# Patient Record
Sex: FEMALE | Race: WHITE | NOT HISPANIC OR LATINO | Employment: UNEMPLOYED | URBAN - METROPOLITAN AREA
[De-identification: names, ages, dates, MRNs, and addresses within clinical notes are randomized per-mention and may not be internally consistent; named-entity substitution may affect disease eponyms.]

---

## 2018-10-29 ENCOUNTER — OFFICE VISIT - HEALTHEAST (OUTPATIENT)
Dept: INTERNAL MEDICINE | Facility: CLINIC | Age: 23
End: 2018-10-29

## 2018-10-29 DIAGNOSIS — J06.9 URTI (ACUTE UPPER RESPIRATORY INFECTION): ICD-10-CM

## 2018-10-29 DIAGNOSIS — F33.1 MODERATE EPISODE OF RECURRENT MAJOR DEPRESSIVE DISORDER (H): ICD-10-CM

## 2018-10-29 DIAGNOSIS — J45.20 MILD INTERMITTENT ASTHMA WITHOUT COMPLICATION: ICD-10-CM

## 2018-10-29 RX ORDER — ALBUTEROL SULFATE 90 UG/1
2 AEROSOL, METERED RESPIRATORY (INHALATION) EVERY 6 HOURS PRN
Status: SHIPPED | COMMUNITY
Start: 2018-10-29 | End: 2022-04-07

## 2018-10-29 ASSESSMENT — MIFFLIN-ST. JEOR: SCORE: 1568.69

## 2019-01-11 ENCOUNTER — COMMUNICATION - HEALTHEAST (OUTPATIENT)
Dept: INTERNAL MEDICINE | Facility: CLINIC | Age: 24
End: 2019-01-11

## 2019-01-22 ENCOUNTER — COMMUNICATION - HEALTHEAST (OUTPATIENT)
Dept: TELEHEALTH | Facility: CLINIC | Age: 24
End: 2019-01-22

## 2019-01-22 ENCOUNTER — OFFICE VISIT - HEALTHEAST (OUTPATIENT)
Dept: INTERNAL MEDICINE | Facility: CLINIC | Age: 24
End: 2019-01-22

## 2019-01-22 DIAGNOSIS — F33.1 MODERATE EPISODE OF RECURRENT MAJOR DEPRESSIVE DISORDER (H): ICD-10-CM

## 2019-01-22 DIAGNOSIS — M94.0 COSTOCHONDRITIS: ICD-10-CM

## 2019-01-22 DIAGNOSIS — Z00.00 ROUTINE HISTORY AND PHYSICAL EXAMINATION OF ADULT: ICD-10-CM

## 2019-01-22 DIAGNOSIS — M26.609 TMJ (TEMPOROMANDIBULAR JOINT SYNDROME): ICD-10-CM

## 2019-01-22 ASSESSMENT — MIFFLIN-ST. JEOR: SCORE: 1610.64

## 2019-01-24 LAB
C TRACH DNA SPEC QL PROBE+SIG AMP: NEGATIVE
N GONORRHOEA DNA SPEC QL NAA+PROBE: NEGATIVE

## 2019-01-25 ENCOUNTER — COMMUNICATION - HEALTHEAST (OUTPATIENT)
Dept: INTERNAL MEDICINE | Facility: CLINIC | Age: 24
End: 2019-01-25

## 2019-01-25 LAB
BKR LAB AP ABNORMAL BLEEDING: NO
BKR LAB AP BIRTH CONTROL/HORMONES: NORMAL
BKR LAB AP CERVICAL APPEARANCE: NORMAL
BKR LAB AP GYN ADEQUACY: NORMAL
BKR LAB AP GYN INTERPRETATION: NORMAL
BKR LAB AP GYN OTHER FINDINGS: NORMAL
BKR LAB AP HPV REFLEX: NORMAL
BKR LAB AP LMP: NORMAL
BKR LAB AP PATIENT STATUS: NORMAL
BKR LAB AP PREVIOUS ABNORMAL: NORMAL
BKR LAB AP PREVIOUS NORMAL: NORMAL
HIGH RISK?: NO
PATH REPORT.COMMENTS IMP SPEC: NORMAL
RESULT FLAG (HE HISTORICAL CONVERSION): NORMAL

## 2019-02-19 ENCOUNTER — COMMUNICATION - HEALTHEAST (OUTPATIENT)
Dept: INTERNAL MEDICINE | Facility: CLINIC | Age: 24
End: 2019-02-19

## 2019-04-04 ENCOUNTER — COMMUNICATION - HEALTHEAST (OUTPATIENT)
Dept: INTERNAL MEDICINE | Facility: CLINIC | Age: 24
End: 2019-04-04

## 2019-04-04 DIAGNOSIS — F33.1 MODERATE EPISODE OF RECURRENT MAJOR DEPRESSIVE DISORDER (H): ICD-10-CM

## 2020-03-19 ENCOUNTER — COMMUNICATION - HEALTHEAST (OUTPATIENT)
Dept: INTERNAL MEDICINE | Facility: CLINIC | Age: 25
End: 2020-03-19

## 2020-07-23 ENCOUNTER — VIRTUAL VISIT (OUTPATIENT)
Dept: FAMILY MEDICINE | Facility: OTHER | Age: 25
End: 2020-07-23
Payer: COMMERCIAL

## 2020-07-24 ENCOUNTER — AMBULATORY - HEALTHEAST (OUTPATIENT)
Dept: NURSING | Facility: CLINIC | Age: 25
End: 2020-07-24

## 2020-07-24 ENCOUNTER — AMBULATORY - HEALTHEAST (OUTPATIENT)
Dept: FAMILY MEDICINE | Facility: CLINIC | Age: 25
End: 2020-07-24

## 2020-07-24 DIAGNOSIS — Z20.822 SUSPECTED COVID-19 VIRUS INFECTION: ICD-10-CM

## 2020-07-25 NOTE — PROGRESS NOTES
"Date: 2020 11:40:16  Clinician: Sabino Bell  Clinician NPI: [id number]  Patient: Ruthy David  Patient : 1995  Patient Address: [address] [place] [address]  Patient Phone: [phone]  Visit Protocol: URI  Patient Summary:  Ruthy is a 24 year old ( : 1995 ) female who initiated a Visit for COVID-19 (Coronavirus) evaluation and screening. When asked the question \"Please sign me up to receive news, health information and promotions from [employer].\", Ruthy responded \"No\".    Ruthy states her symptoms started gradually 3-4 days ago.   Her symptoms consist of diarrhea, facial pain or pressure, nasal congestion, malaise, and a headache.   Symptom details     Nasal secretions: The color of her mucus is clear.    Facial pain or pressure: The facial pain or pressure feels worse when bending over or leaning forward.     Headache: She states the headache is moderate (4-6 on a 10 point pain scale).      Ruthy denies having wheezing, nausea, teeth pain, ageusia, myalgias, sore throat, anosmia, fever, cough, vomiting, rhinitis, ear pain, and chills. She also denies having recent facial or sinus surgery in the past 60 days, double sickening (worsening symptoms after initial improvement), and taking antibiotic medication in the past month. She is not experiencing dyspnea.    Pertinent COVID-19 (Coronavirus) information  In the past 14 days, Ruthy has not worked in a congregate living setting.   She does not work or volunteer as healthcare worker or a  and does not work or volunteer in a healthcare facility.   Ruthy also has not lived in a congregate living setting in the past 14 days. She does not live with a healthcare worker.   Ruthy has not had a close contact with a laboratory-confirmed COVID-19 patient within 14 days of symptom onset.   Pertinent medical history  Ruthy does not get yeast infections when she takes antibiotics.   Ruthy needs a return to work/school " note.   Weight: 180 lbs   Ruthy smokes or uses smokeless tobacco.   She denies pregnancy and denies breastfeeding. She is currently menstruating.   Weight: 180 lbs    MEDICATIONS: Claritin RediTabs oral, duloxetine oral, bupropion HCl oral, ALLERGIES: NKDA  Clinician Response:  Dear Ruthy,   Your symptoms show that you may have coronavirus (COVID-19). This illness can cause fever, cough and trouble breathing. Many people get a mild case and get better on their own. Some people can get very sick.  What should I do?  We would like to test you for this virus.   1. Please call [phone] to schedule your visit. Explain that [facility] have [facility]-19 test. Be ready to share your [facility] ID number.  The following will serve as your written order for this COVID Test, ordered by me, for the indication of suspected COVID [Z20.828]: The test will be ordered in [employer], our electronic health record, after you are scheduled. It will show as ordered and authorized by Aj Washington MD.  Order: COVID-19 (Coronavirus) PCR for SYMPTOMATIC testing from [facility].      2. When it's time for your COVID test:  Stay at least 6 feet away from others. (If someone will drive you to your test, stay in the backseat, as far away from the  as you can.)   Cover your mouth and nose with a mask, tissue or washcloth.  Go straight to the testing site. Don't make any stops on the way there or back.      3.Starting now: Stay home and away from others (self-isolate) until:   You've had no fever---and no medicine that reduces fever---for 3 full days (72 hours). And...   Your other symptoms have gotten better. For example, your cough or breathing has improved. And...   At least 10 days have passed since your symptoms started.       During this time, don't leave the house except for testing or medical care.   Stay in your own room, even for meals. Use your own bathroom if you can.   Stay away from others in your home. No  "hugging, kissing or shaking hands. No visitors.  Don't go to work, school or anywhere else.    Clean \"high touch\" surfaces often (doorknobs, counters, handles, etc.). Use a household cleaning spray or wipes. You'll find a full list of  on the EPA website: www.epa.gov/pesticide-registration/list-n-disinfectants-use-against-sars-cov-2.   Cover your mouth and nose with a mask, tissue or washcloth to avoid spreading germs.  Wash your hands and face often. Use soap and water.  Caregivers in these groups are at risk for severe illness due to COVID-19:  o People 65 years and older  o People who live in a nursing home or long-term care facility  o People with chronic disease (lung, heart, cancer, diabetes, kidney, liver, immunologic)  o People who have a weakened immune system, including those who:   Are in cancer treatment  Take medicine that weakens the immune system, such as corticosteroids  Had a bone marrow or organ transplant  Have an immune deficiency  Have poorly controlled HIV or AIDS  Are obese (body mass index of 40 or higher)  Smoke regularly   o Caregivers should wear gloves while washing dishes, handling laundry and cleaning bedrooms and bathrooms.  o Use caution when washing and drying laundry: Don't shake dirty laundry, and use the warmest water setting that you can.  o For more tips, go to www.cdc.gov/coronavirus/2019-ncov/downloads/10Things.pdf.    4.Sign up for [employer]. We know it's scary to hear that you might have COVID-19. We want to track your symptoms to make sure you're okay over the next 2 weeks. Please look for an email from [employer]---this is a free, online program that we'll use to keep in touch. To sign up, follow the link in the email. Learn more at [email or web address]  How can I take care of myself?   Get lots of rest. Drink extra fluids (unless a doctor has told you not to).   Take Tylenol (acetaminophen) for fever or pain. If you have liver or kidney problems, " ask your family doctor if it's okay to take Tylenol.   Adults can take either:    650 mg (two 325 mg pills) every 4 to 6 hours, or...   1,000 mg (two 500 mg pills) every 8 hours as needed.    Note: Don't take more than 3,000 mg in one day. Acetaminophen is found in many medicines (both prescribed and over-the-counter medicines). Read all labels to be sure you don't take too much.   For children, check the Tylenol bottle for the right dose. The dose is based on the child's age or weight.    If you have other health problems (like cancer, heart failure, an organ transplant or severe kidney disease): Call your specialty clinic if you don't feel better in the next 2 days.       Know when to call 911. Emergency warning signs include:    Trouble breathing or shortness of breath Pain or pressure in the chest that doesn't go away Feeling confused like you haven't felt before, or not being able to wake up Bluish-colored lips or face.  Where can I get more information?   [employer] -- About COVID-19: [email or web address]   CDC -- What to Do If You're Sick: www.cdc.gov/coronavirus/2019-ncov/about/steps-when-sick.html   CDC -- Ending Home Isolation: www.cdc.gov/coronavirus/2019-ncov/hcp/disposition-in-home-patients.html   [facility] -- Caring for Someone: www.cdc.gov/coronavirus/2019-ncov/if-you-are-sick/care-for-someone.html   [facility] -- Interim Guidance for Hospital Discharge to Home: www.health.[facility].mn.us/diseases/coronavirus/hcp/hospdischarge.pdf   [facility] clinical trials (COVID-19 research studies): clinicalaffairs.[facility].edu/[facility]-clinical-trials    Below are the COVID-19 hotlines at the [place] Department of Health ([facility]). Interpreters are available.    For health questions: Call 223-382-9508 or 1-433.926.7443 (7 a.m. to 7 p.m.) For questions about schools and childcare: Call 742-691-6683 or [phone] (7 a.m. to 7 p.m.)    Diagnosis: Nasal congestion  Diagnosis ICD: R09.81

## 2020-07-26 ENCOUNTER — COMMUNICATION - HEALTHEAST (OUTPATIENT)
Dept: FAMILY MEDICINE | Facility: CLINIC | Age: 25
End: 2020-07-26

## 2020-09-14 ENCOUNTER — COMMUNICATION - HEALTHEAST (OUTPATIENT)
Dept: SCHEDULING | Facility: CLINIC | Age: 25
End: 2020-09-14

## 2020-09-15 ENCOUNTER — OFFICE VISIT - HEALTHEAST (OUTPATIENT)
Dept: FAMILY MEDICINE | Facility: CLINIC | Age: 25
End: 2020-09-15

## 2020-09-15 DIAGNOSIS — Z30.430 ENCOUNTER FOR IUD INSERTION: ICD-10-CM

## 2020-09-15 DIAGNOSIS — Z23 NEED FOR VACCINATION: ICD-10-CM

## 2020-09-15 DIAGNOSIS — F33.1 MODERATE EPISODE OF RECURRENT MAJOR DEPRESSIVE DISORDER (H): ICD-10-CM

## 2020-09-15 LAB — HCG UR QL: NEGATIVE

## 2020-09-15 RX ORDER — BUPROPION HYDROCHLORIDE 150 MG/1
150 TABLET ORAL DAILY
Qty: 90 TABLET | Refills: 0 | Status: SHIPPED | OUTPATIENT
Start: 2020-09-15 | End: 2023-08-23

## 2020-09-15 RX ORDER — DULOXETIN HYDROCHLORIDE 60 MG/1
60 CAPSULE, DELAYED RELEASE ORAL DAILY
Qty: 90 CAPSULE | Refills: 0 | Status: SHIPPED | OUTPATIENT
Start: 2020-09-15

## 2020-09-15 ASSESSMENT — ANXIETY QUESTIONNAIRES
GAD7 TOTAL SCORE: 11
7. FEELING AFRAID AS IF SOMETHING AWFUL MIGHT HAPPEN: SEVERAL DAYS
1. FEELING NERVOUS, ANXIOUS, OR ON EDGE: NEARLY EVERY DAY
2. NOT BEING ABLE TO STOP OR CONTROL WORRYING: MORE THAN HALF THE DAYS
4. TROUBLE RELAXING: MORE THAN HALF THE DAYS
5. BEING SO RESTLESS THAT IT IS HARD TO SIT STILL: NOT AT ALL
3. WORRYING TOO MUCH ABOUT DIFFERENT THINGS: MORE THAN HALF THE DAYS
6. BECOMING EASILY ANNOYED OR IRRITABLE: SEVERAL DAYS
IF YOU CHECKED OFF ANY PROBLEMS ON THIS QUESTIONNAIRE, HOW DIFFICULT HAVE THESE PROBLEMS MADE IT FOR YOU TO DO YOUR WORK, TAKE CARE OF THINGS AT HOME, OR GET ALONG WITH OTHER PEOPLE: SOMEWHAT DIFFICULT

## 2020-09-15 ASSESSMENT — PATIENT HEALTH QUESTIONNAIRE - PHQ9: SUM OF ALL RESPONSES TO PHQ QUESTIONS 1-9: 13

## 2020-09-15 NOTE — ASSESSMENT & PLAN NOTE
The patient is doing well. Her psychiatrist has left her practice and the patient is in the progress of establishing with a new psychiatrist. She is requesting a bridge of her medication which was given.

## 2020-09-16 LAB
C TRACH DNA SPEC QL PROBE+SIG AMP: NEGATIVE
N GONORRHOEA DNA SPEC QL NAA+PROBE: NEGATIVE

## 2020-09-28 ENCOUNTER — AMBULATORY - HEALTHEAST (OUTPATIENT)
Dept: FAMILY MEDICINE | Facility: CLINIC | Age: 25
End: 2020-09-28

## 2020-09-28 ENCOUNTER — VIRTUAL VISIT (OUTPATIENT)
Dept: FAMILY MEDICINE | Facility: OTHER | Age: 25
End: 2020-09-28
Payer: COMMERCIAL

## 2020-09-28 DIAGNOSIS — Z20.822 SUSPECTED COVID-19 VIRUS INFECTION: ICD-10-CM

## 2020-09-28 PROCEDURE — 99421 OL DIG E/M SVC 5-10 MIN: CPT | Performed by: PHYSICIAN ASSISTANT

## 2020-09-28 NOTE — PROGRESS NOTES
"Date: 2020 12:16:18  Clinician: Sabino Bell  Clinician NPI: [id number]  Patient: Ruthy David  Patient : 1995  Patient Address: [address] [place] [address]  Patient Phone: [phone]  Visit Protocol: URI  Patient Summary:  Ruthy is a 25 year old ( : 1995 ) female who initiated a [facility] Visit for COVID-19 (Coronavirus) evaluation and screening. When asked the question \"Please sign me up to receive news, health information and promotions from [facility].\", Ruthy responded \"No\".    Ruthy states her symptoms started today.   Her symptoms consist of a cough, nasal congestion, ear pain, wheezing, malaise, and diarrhea. She is experiencing mild difficulty breathing with activities but can speak normally in full sentences. Ruthy also feels feverish.   Symptom details     Nasal secretions: The color of her mucus is clear.    Cough: Ruthy coughs every 5-10 minutes and her cough is not more bothersome at night. Phlegm does not come into her throat when she coughs. She does not believe her cough is caused by post-nasal drip.     Temperature: Her current temperature is 99.5 degrees Fahrenheit.     Wheezing: Ruthy has been diagnosed with asthma. Additional wheezing details as reported by the patient (free text): Shortness of breath and wheezing woke me up this morning, but have dissipated with the use of albuterol emergency inhaler. Continuing to cough every few minutes        Ruthy denies having headache, anosmia, vomiting, rhinitis, nausea, enlarged lymph nodes, facial pain or pressure, myalgias, chills, sore throat, teeth pain, and ageusia. She also denies taking antibiotic medication in the past month and having recent facial or sinus surgery in the past 60 days.   Precipitating events  She has not recently been exposed to someone with influenza. Ruthy has not been in close contact with any high risk individuals.   Pertinent COVID-19 (Coronavirus) information  In the past 14 " days, Ruthy has not worked in a congregate living setting.   She does not work or volunteer as healthcare worker or a  and does not work or volunteer in a healthcare facility.   Ruthy also has not lived in a congregate living setting in the past 14 days. She does not live with a healthcare worker.   Ruthy has not had a close contact with a laboratory-confirmed COVID-19 patient within 14 days of symptom onset.   Since December 2019, Ruthy and has not had upper respiratory infection or influenza-like illness. Has not been diagnosed with lab-confirmed COVID-19 test   Pertinent medical history  Ruthy does not get yeast infections when she takes antibiotics.   Ruthy needs a return to work/school note.   Weight: 190 lbs   Ruthy smokes or uses smokeless tobacco.   She denies pregnancy and denies breastfeeding. She has menstruated in the past month.   Weight: 190 lbs    MEDICATIONS: Claritin RediTabs oral, duloxetine oral, bupropion HCl oral, ALLERGIES: NKDA  Clinician Response:  Dear Ruthy,   Your symptoms show that you may have coronavirus (COVID-19). This illness can cause fever, cough and trouble breathing. Many people get a mild case and get better on their own. Some people can get very sick.  What should I do?  We would like to test you for this virus.   1. Please call [phone] to schedule your visit. Explain that you were referred by [facility] to have a COVID-19 test. Be ready to [facility].  The following will serve as your written order for this COVID Test, ordered by me, for the indication of suspected COVID [Z20.828]: The test will be ordered in [employer], our electronic health record, after you are scheduled. It will show as ordered and authorized by Aj Washington MD.  Order: COVID-19 (Coronavirus) PCR for SYMPTOMATIC testing from [facility].      2. When it's time for your COVID test:  Stay at least 6 feet away from others. (If someone will drive you to your test, stay in the  "backseat, as far away from the  as you can.)   Cover your mouth and nose with a mask, tissue or washcloth.  Go straight to the testing site. Don't make any stops on the way there or back.      3.Starting now: Stay home and away from others (self-isolate) until:   You've had no fever---and no medicine that reduces fever---for one full day (24 hours). And...   Your other symptoms have gotten better. For example, your cough or breathing has improved. And...   At least 10 days have passed since your symptoms started.       During this time, don't leave the house except for testing or medical care.   Stay in your own room, even for meals. Use your own bathroom if you can.   Stay away from others in your home. No hugging, kissing or shaking hands. No visitors.  Don't go to work, school or anywhere else.    Clean \"high touch\" surfaces often (doorknobs, counters, handles, etc.). Use a household cleaning spray or wipes. You'll find a full list of  on the EPA website: www.epa.gov/pesticide-registration/list-n-disinfectants-use-against-sars-cov-2.   Cover your mouth and nose with a mask, tissue or washcloth to avoid spreading germs.  Wash your hands and face often. Use soap and water.  Caregivers in these groups are at risk for severe illness due to COVID-19:  o People 65 years and older  o People who live in a nursing home or long-term care facility  o People with chronic disease (lung, heart, cancer, diabetes, kidney, liver, immunologic)  o People who have a weakened immune system, including those who:   Are in cancer treatment  Take medicine that weakens the immune system, such as corticosteroids  Had a bone marrow or organ transplant  Have an immune deficiency  Have poorly controlled HIV or AIDS  Are obese (body mass index of 40 or higher)  Smoke regularly   o Caregivers should wear gloves while washing dishes, handling laundry and cleaning bedrooms and bathrooms.  o Use caution when washing and drying " laundry: Don't shake dirty laundry, and use the warmest water setting that you can.  o For more tips, go to www.cdc.gov/coronavirus/2019-ncov/downloads/10Things.pdf.    How can I take care of myself?    Get lots of rest. Drink extra fluids (unless a doctor has told you not to).   Take Tylenol (acetaminophen) for fever or pain. If you have liver or kidney problems, ask your family doctor if it's okay to take Tylenol.   Adults can take either:    650 mg (two 325 mg pills) every 4 to 6 hours, or...   1,000 mg (two 500 mg pills) every 8 hours as needed.    Note: Don't take more than 3,000 mg in one day. Acetaminophen is found in many medicines (both prescribed and over-the-counter medicines). Read all labels to be sure you don't take too much.   For children, check the Tylenol bottle for the right dose. The dose is based on the child's age or weight.    If you have other health problems (like cancer, heart failure, an organ transplant or severe kidney disease): Call your specialty clinic if you don't feel better in the next 2 days.       Know when to call 911. Emergency warning signs include:    Trouble breathing or shortness of breath Pain or pressure in the chest that doesn't go away Feeling confused like you haven't felt before, or not being able to wake up Bluish-colored lips or face.  Where can I get more information?   [facility] -- About COVID-19: [email or web address]   CDC -- What to Do If You're Sick: www.cdc.gov/coronavirus/2019-ncov/about/steps-when-sick.html   CDC -- Ending Home Isolation: www.cdc.gov/coronavirus/2019-ncov/hcp/disposition-in-home-patients.html   CDC -- Caring for Someone: www.cdc.gov/coronavirus/2019-ncov/if-you-are-sick/care-for-someone.html   [facility] -- Interim Guidance for Hospital Discharge to Home: www.health.[facility].mn.us/diseases/coronavirus/hcp/hospdischarge.pdf   [facility] clinical trials (COVID-19 research studies):  clinicalaffairs.[facility]-clinical-trials    Below are the COVID-19 hotlines at the [place] Department of Health ([facility]). Interpreters are available.    For health questions: Call 041-436-0376 or 1-722.373.9825 (7 a.m. to 7 p.m.) For questions about schools and childcare: Call 233-024-6637 or 1-408.526.2968 (7 a.m. to 7 p.m.)    Diagnosis: Nasal congestion  Diagnosis ICD: R09.81

## 2020-09-30 ENCOUNTER — COMMUNICATION - HEALTHEAST (OUTPATIENT)
Dept: SCHEDULING | Facility: CLINIC | Age: 25
End: 2020-09-30

## 2021-04-15 ENCOUNTER — AMBULATORY - HEALTHEAST (OUTPATIENT)
Dept: FAMILY MEDICINE | Facility: CLINIC | Age: 26
End: 2021-04-15

## 2021-04-15 ENCOUNTER — OFFICE VISIT - HEALTHEAST (OUTPATIENT)
Dept: FAMILY MEDICINE | Facility: CLINIC | Age: 26
End: 2021-04-15

## 2021-04-15 DIAGNOSIS — Z20.822 SUSPECTED COVID-19 VIRUS INFECTION: ICD-10-CM

## 2021-04-17 ENCOUNTER — COMMUNICATION - HEALTHEAST (OUTPATIENT)
Dept: SCHEDULING | Facility: CLINIC | Age: 26
End: 2021-04-17

## 2021-05-27 ASSESSMENT — PATIENT HEALTH QUESTIONNAIRE - PHQ9: SUM OF ALL RESPONSES TO PHQ QUESTIONS 1-9: 13

## 2021-05-28 ASSESSMENT — ANXIETY QUESTIONNAIRES: GAD7 TOTAL SCORE: 11

## 2021-05-28 ASSESSMENT — ASTHMA QUESTIONNAIRES: ACT_TOTALSCORE: 25

## 2021-06-02 VITALS — HEIGHT: 64 IN | BODY MASS INDEX: 31.76 KG/M2 | WEIGHT: 186 LBS

## 2021-06-02 VITALS — WEIGHT: 190 LBS | HEIGHT: 66 IN | BODY MASS INDEX: 30.53 KG/M2

## 2021-06-04 VITALS
DIASTOLIC BLOOD PRESSURE: 72 MMHG | TEMPERATURE: 98.3 F | BODY MASS INDEX: 31.81 KG/M2 | HEART RATE: 80 BPM | WEIGHT: 194.1 LBS | RESPIRATION RATE: 16 BRPM | SYSTOLIC BLOOD PRESSURE: 114 MMHG

## 2021-06-11 NOTE — PATIENT INSTRUCTIONS - HE
Intrauterine Device Insertion   Patient Instructions    WHAT TO EXPECT AFTER THE PROCEDURE:    Insertion of the IUD may cause some discomfort, such as cramping. The cramping should improve after the IUD is in place. You may have bleeding after the procedure. This is normal. It varies from light spotting for a few days to menstrual-like bleeding.  You may experience irregular bleeding for 3-6 months after IUD is placed and this is normal.  After 6 months, some patients don't have menses at all.  Some patients continue to have menses monthly but they are usually lighter with reduced cramping.  When the IUD is in place, a string will extend past the cervix into the vagina for 1-2 inches. The strings should not bother you or your partner.   HOME CARE INSTRUCTIONS:     1) Ibuprofen 2-3 tabs every 6 hours as needed for pain or cramping.  2) We will notify you of results of Gonorrhea/Chlamydia testing when available.  This test is standard when a IUD is placed.  3) Birth Control: You should abstain from sex or use a barrier method such as condoms for 10 days after placement.  4) Schedule a follow up appointment with Dr. Levine in 4-6 weeks.  5) Check to make sure you can feel the strings once a month.  You should schedule an appointment to be seen if you can't the feel strings.  You should have the strings checked by exam with a healthcare provider at least once per year.  6) The  IUD does NOT protect against sexually transmitted diseases.  You should use condoms if you are at risk of arsenio a sexually transmitted disease.  You should be seen promptly if you develop symptoms or have a known exposure.  The best way to reduce risk of STDs is to have a single monogamous relationship.  7) Mild to moderate bleeding and cramping are normal after placement of IUD.  You should be seen if you are having severe symptoms.  8) Please feel free to call with any questions or concerns.  SEEK MEDICAL CARE IF:     You have bleeding  that is heavier or lasts longer than a normal menstrual cycle.    You have a fever.    You have increasing cramps or abdominal pain not relieved with medicine.    You have abdominal pain that does not seem to be related to the same area of earlier cramping and pain.    You are lightheaded, unusually weak, or faint.    You have abnormal vaginal discharge or smells.    You have pain during sexual intercourse.    You cannot feel the IUD strings, or the IUD string has gotten longer.    You feel the IUD at the opening of the cervix in the vagina.    You think you are pregnant, or you miss your menstrual period.    The IUD string is hurting your sex partner.

## 2021-06-11 NOTE — PROGRESS NOTES
The patient is here for IUD replacement. She is also wondering about refill of her medications. Her psychiatrist recently left her position. She is establishing with a new provider but needs a bridge of her medications. She has been on her current dosages for about a year and reports she is doing quite well.

## 2021-06-11 NOTE — PROGRESS NOTES
Removal and Insertion of IUD    Date/Time: 9/15/2020 10:17 AM  Performed by: Love Levine MD  Authorized by: Love Levine MD   Consent: Verbal consent obtained. Written consent obtained.  Risks and benefits: risks, benefits and alternatives were discussed  Consent given by: patient  Comments: IUD Insertion Procedure Note    Pre-operative Diagnosis: Desire for contraception    Post-operative Diagnosis: same    Indications: contraception    History: The patient has a copper IUD placed in 12/2016. She has continued to have a monthly menses which is heavy at times. In May she was removing a vaginal cup and thinks she dislodged the IUD. She would like it removed and replaced with a Mirena IUD.    Procedure Details   Urine pregnancy test was done today and result was negative.  The risks (including infection, bleeding, pain, and uterine perforation) and benefits of the procedure were explained to the patient and written informed consent was obtained.      Bimanual exam: normal single, nontender  Speculum placed.  Cervix appears normal.  The end of her Paraguard IUD is visible through her cervix. Cervix cleansed with Betadine. Ring forceps and gentle traction used to remove IUD without difficulty. Tenaculum applied to the cervix at 12 O'clock and gentle traction applied.  Cervical Os finder was not needed. Uterus sounded to 7 cm. IUD inserted following aseptic technique by usual protocol without difficulty. String visible and trimmed to 3 cm. Patient tolerated procedure very well.    IUD Information:  Mirena, Lot # [id number], Expiration date Oct 2022.    Condition:  Stable    Complications:  None    Plan:  The patient was given after care instructions.

## 2021-06-11 NOTE — TELEPHONE ENCOUNTER
Upcoming Appointment Question  When is the appointment: Tomorrow  What is your appointment for?: IUD replacement  Who is your appointment scheduled with?: Dr Roland  What is your question/concern?: wondering if this would be a consult appointment firdt or if provider can replace it at appointment.  Please call.  Okay to leave a detailed message?: Yes

## 2021-06-11 NOTE — TELEPHONE ENCOUNTER
Reason contacted:  Upcoming Appt  Information relayed:  Notified pt that appt was scheduled as a 40 minute IUD Removal/Replace.  Additional questions:  No  Further follow-up needed:  No  Okay to leave a detailed message:  Yes

## 2021-06-16 PROBLEM — Z97.5 IUD (INTRAUTERINE DEVICE) IN PLACE: Status: ACTIVE | Noted: 2020-09-15

## 2021-06-16 NOTE — PATIENT INSTRUCTIONS - HE
Dear Ruthy David,    Your symptoms show that you may have coronavirus (COVID-19). This illness can cause fever, cough and trouble breathing. Many people get a mild case and get better on their own. Some people can get very sick.    Will I be tested for COVID-19?  We would like to test you for Covid-19 virus. I have placed orders for this test.     To schedule: go to your [facility] home page and scroll down to the section that says  You have an appointment that needs to be scheduled  and click the large green button that says  Schedule Now  and follow the steps to find the next available openings.    If you are unable to complete these [facility] scheduling steps, please call [phone] to schedule your testing.     Return to work/school/ guidance:  Please let your workplace manager and staffing office know when your quarantine ends     We can t give you an exact date as it depends on the above. You can calculate this on your own or work with your manager/staffing office to calculate this. (For example if you were exposed on 10/4, you would have to quarantine for 14 full days. That would be through 10/18. You could return on 10/19.)      If you receive a positive COVID-19 test result, follow the guidance of the those who are giving you the results. Usually the return to work is 10 (or in some cases 20 days from symptom onset.) If you work at [facility], you must also be cleared by Employee Occupational Health and Safety to return to work.        If you receive a negative COVID-19 test result and did not have a high risk exposure to someone with a known positive COVID-19 test, you can return to work once you're free of fever for 24 hours without fever-reducing medication and your symptoms are improving or resolved.      If you receive a negative COVID-19 test and If you had a high risk exposure to someone who has tested positive for COVID-19 then you can return to work 14 days after your last  contact with the positive individual    Note: If you have ongoing exposure to the covid positive person, this quarantine period may be more than 14 days. (For example, if you are continued to be exposed to your child who tested positive and cannot isolate from them, then the quarantine of 7-14 days can't start until your child is no longer contagious. This is typically 10 days from onset of the child's symptoms. So the total duration may be 17-24 days in this case.)    Sign up for [employer].   We know it's scary to hear that you might have COVID-19. We want to track your symptoms to make sure you're okay over the next 2 weeks. Please look for an email from [employer]--this is a free, online program that we'll use to keep in touch. To sign up, follow the link in the email you will receive. Learn more at [email or web address]    How can I take care of myself?    Get lots of rest. Drink extra fluids (unless a doctor has told you not to)    Take Tylenol (acetaminophen) or ibuprofen for fever or pain. If you have liver or kidney problems, ask your family doctor if it's okay to take Tylenol o ibuprofen    If you have other health problems (like cancer, heart failure, an organ transplant or severe kidney disease): Call your specialty clinic if you don't feel better in the next 2 days.    Know when to call 911. Emergency warning signs include:  o Trouble breathing or shortness of breath  o Pain or pressure in the chest that doesn't go away  o Feeling confused like you haven't felt before, or not being able to wake up  o Bluish-colored lips or face    Where can I get more information?  [facility] - About COVID-19:   [email or web address]    CDC - What to Do If You're Sick:   www.cdc.gov/coronavirus/2019-ncov/about/steps-when-sick.html        April 15, 2021  RE:  Ruthy Palmerland                                                                                                                   [address]  [place] [address]      To whom it may concern:    I evaluated Ruthy David on 04/15/21. Ruthy David should be excused from work/school.     They should let their workplace manager and staffing office know when their quarantine ends.    We can not give an exact date as it depends on the information below. They can calculate this on their own or work with their manager/staffing office to calculate this. (For example if they were exposed on 10/04, they would have to quarantine for 14 full days. That would be through 10/18. They could return on 10/19.)    Quarantine Guidelines:      If patient receives a positive COVID-19 test result, they should follow the guidance of those who are giving the results. Usually the return to work is 10 (or in some cases 20 days from symptom onset.) If they work at [employer], they must be cleared by Employee Occupational Health and Safety to return to work.        If patient receives a negative COVID-19 test result and did not have a high risk exposure to someone with a known positive COVID-19 test, they can return to work once they're free of fever for 24 hours without fever-reducing medication and their symptoms are improving or resolved.      If patient receives a negative COVID-19 test and if they had a high risk exposure to someone who has tested positive for COVID-19 then they can return to work 14 days after their last contact with the positive individual    Note: If there is ongoing exposure to the covid positive person, this quarantine period may be longer than 14 days. (For example, if they are continually exposed to their child, who tested positive and cannot isolate from them, then the quarantine of 7-14 days can't start until their child is no longer contagious. This is typically 10 days from onset to the child's symptoms. So the total duration may be 17-24 days in this case.)    Sincerely,  Salvatore Bennett,  MEHREEN

## 2021-06-17 NOTE — PATIENT INSTRUCTIONS - HE
Patient Instructions by Viviana Hope MD at 1/22/2019  9:00 AM     Author: Viviana Hope MD Service: -- Author Type: Physician    Filed: 1/22/2019  9:31 AM Encounter Date: 1/22/2019 Status: Addendum    : Viviana Hope MD (Physician)    Related Notes: Original Note by Viviana Hope MD (Physician) filed at 1/22/2019  9:29 AM         Patient Education     Costochondritis    Costochondritis is inflammation of a rib or the cartilage that connects a rib to your breastbone (sternum). It causes tenderness, and sometimes chest pain may be sharp or aching, or it may feel like pressure. Pain may get worse with deep breathing, movement, or exercise. In some cases, the pain is mistaken for a heart attack. Despite this, the condition is not serious. Read on to learn more about the condition and how it can be treated.  What causes costochondritis?  The cause of costochondritis is not completely clear, but it may happen after a chest injury, chest infection, or coughing episode. Some physical activities can sometimes lead to costochondritis. Large-breasted women may be more likely to have the condition. Often, the reason for the inflammation is unknown.  Diagnosing costochondritis  There is no test for costochondritis. The condition is diagnosed by the symptoms you have. Your healthcare provider will perform a physical exam. He or she will ask you about your symptoms and examine your chest for tenderness. In some cases, tests are done to rule out more serious problems. These tests may include imaging tests such as chest X-ray, CT scan, or an ECG.  Treating costochondritis  If an underlying cause is found, treatment for that will likely relieve the problem. Costochondritis often goes away on its own. The course of the condition varies from person to person. It usually lasts from weeks to months. In some cases, mild symptoms continue for months to years. To ease symptoms:    Take medicine as  directed. These relieve pain and swelling. Ibuprofen or other NSAIDs are often recommended. In some cases, you may be given prescription medicine, such as muscle relaxants.    Avoid activities that put stress on the chest or spine.    Apply a heating pad (set to warm, not too high, heat) to the breastbone several times a day.    Perform stretching exercises as directed.  Call the healthcare provider right away if you have any of the following:    Pain that is not relieved by medicine    Shortness of breath    Lightheadedness, dizziness, or fainting    Feeling of irregular heartbeat or fast pulse  Anyone with chest pain should see a healthcare provider, especially those who are older and may be at risk for heart disease.   Date Last Reviewed: 10/1/2016    8920-7404 [employer]. [address] [place] [address], [place] [address]. All rights reserved. This information is not intended as a substitute for professional medical care. Always follow your healthcare professional's instructions.           Patient Education     TMJ Syndrome  The temporomandibular joint (TMJ) is the joint that connects your lower jaw to your head. You can feel it in front of your ears when you open and close your mouth. TMJ disorders involve chronic or recurrent pain in the joint. When treated, symptoms of TMJ disorders usually go away within a few months.  Causes  There is no widely agreed-on cause of TMJ disorders. They have been linked to injury, arthritis, chronic fatigue syndrome, and fibromyalgia. A definite connection has not been shown, though.  Symptoms    Pain in the face, jaw, or neck    Pain with jaw movement or chewing    Locking or catching sensation of the jaw    Clicking, popping, or grinding sounds with movement of the TMJ    Headache    Ear pain  Home care  Modest, nonsurgical treatments are a good first step toward relieving symptoms. Try the approaches described below.    Rest the jaw by avoiding crunchy or hard-to-chew  foods. Dont eat hard or sticky candies. Soft foods and liquids are easier on the jaw.    Protect your jaw while yawning. If you need to yawn, put your fist under your chin to prevent your mouth from opening up too wide.    To help relieve pain, try applying hot or cold packs to the painful area. Try both hot and cold to find out which works best for you. To make a cold pack, put ice cubes in a plastic bag that seals at the top. Wrap the bag in a clean, thin towel or cloth. Never put ice or an ice pack directly on the skin. If you use hot packs (small towels soaked in hot water), be careful not to burn yourself.    You may take acetaminophen or ibuprofen for pain, unless you were given a different pain medicine. (Note: If you have chronic liver or kidney disease or have ever had a stomach ulcer or gastrointestinal bleeding, talk with your healthcare provider before using these medicines. Also talk to your provider if you are taking medicine to prevent blood clots.) Dont give aspirin to a child younger than age 19 unless directed by the kvng provider. Taking aspirin can put a child at risk for Reye syndrome. This is a rare but very serious disorder that most often affects the brain and the liver.  Reducing stress  If stress seems to be contributing to your symptoms, try to identify the sources of stress in your life. These arent always obvious. Common stressors include:    Everyday hassles. These include things such as traffic jams, missed appointments, or car trouble.    Major life changes. These can be good, such as a new baby or job promotion. And they can be bad, such as losing a job or losing a loved one.    Overload. The feeling that you have too many responsibilities and can't take care of everything at once.    Helplessness. Feeling like your problems are more than you can solve.  When possible, do something about your sources of stress. See if you can avoid hassles, limit the amount of change in your life  at one time, and take breaks when you feel overloaded.  Unfortunately, many stressful situations cannot be avoided. So learning how to manage stress better is very important. Getting regular exercise, eating nutritious, balanced meals, and getting adequate rest all help to make everyday stress more manageable. Certain techniques are also helpful: relaxation and breathing exercises, visualization, biofeedback, meditation, or simply taking some time out to clear your mind. For more information, talk with your healthcare provider.  Follow-up care  Follow up with your healthcare provider, or as advised. Further testing and additional treatment may be required. If changes to your lifestyle do not improve your symptoms, talk with your healthcare provider about other available therapies. These include bite guards for help with teeth grinding, stress management techniques, and more. If stress is an important factor and does not respond to the above simple measures, talk with your healthcare provider about a referral for stress management.  If X-rays were done, they will be reviewed by a specialist. You will be notified of the results, especially if they affect treatment.  Call 911  Call 911 if any of these occur:    Trouble breathing or swallowing, wheezing    Confusion    Extreme drowsiness or trouble awakening    Fainting or loss of consciousness    Rapid heart rate  When to seek medical advice  Call your healthcare provider right away if any of these occur:    Swollen or red face    Pain gets worse    Neck, mouth, tooth, or throat pain gets worse    Fever of 100.4 F (38 C) or higher, or as directed by your healthcare provider  Date Last Reviewed: 10/1/2017    1647-8855 [employer]. [address] [place] [address], [place] [address]. All rights reserved. This information is not intended as a substitute for professional medical care. Always follow your healthcare professional's instructions.           Patient  Education     Pain Relief Methods for Temporomandibular Disorders (TMD)  You have been diagnosed with temporomandibular disorder (TMD). This term describes a group of problems related to the temporomandibular joint (TMJ) and nearby muscles. The TMJ is located where the upper and lower jaws meet. TMD can cause painful and frustrating symptoms. But your healthcare provider can recommend various pain relief methods as part of your treatment. These may include medicines and certain types of therapy, such as massage or gentle exercise.  Using medicines    Medicines may be prescribed to treat TMD. Others may be available over the counter. The medicine type and dosage will depend on the problem you have. For your safety, tell your healthcare provider if you are currently taking any medicines. Also mention any vitamins, herbs, or supplements you are using. Common medicines used to treat TMD include:    Anti-inflammatories and analgesics. These treat pain, inflammation, osteoarthritis, and rheumatoid arthritis. Anti-inflammatories reduce swelling, heat, redness, and pain. They also help restore function. Analgesics reduce pain. Nonsteroidal anti-inflammatories (NSAIDs) relieve inflammation as well as pain.    Muscle relaxants. These treat myofascial pain. This is pain that occurs in the soft tissues or muscles around the TMJ. Muscle relaxants help ease muscle tension. This reduces pressure on the TMJ from tight jaw muscles.    Antidepressants. These can be used to reduce pain or teeth grinding (bruxism). At higher dosages, these medicines are used to treat depression. Given at low dosages, antidepressants help relieve TMD symptoms. They can reduce muscle pain. They also raise the level of serotonin, a body chemical that improves sleep. This in turn can decrease bruxism during the night.  Treating painful muscles  A trigger point is a painful spot in a tight muscle. It is often painful to the touch and may refer pain to other  places. Your healthcare provider can focus on trigger points using:    Massage, both inside and outside the mouth. This relaxes muscles and improves circulation.    Palpation, which is applying pressure to points of the jaw and face with the fingers.    Cold spray and stretching of the muscles to relax them.    An anesthetic for pain relief. This may be given as an injection by your dentist.  Treating the joint  Therapy may focus directly on the TMJ. There are different ways to treat the joint:    A self-care program helps you treat and manage symptoms on your own. Your program may include exercises. It may also include using ice and heat to relieve pain.    Gentle manipulation reduces pain and restores range of motion. The healthcare provider uses his or her hands to relax muscles and ligaments around the joint.    Exercises strengthen muscles in the jaw and face.    Ultrasound uses sound waves to reduce pain and swelling. It also improves pain and swelling.  Treating inflammation  When the joint is inflamed, movement becomes difficult. It is even impossible at times. Your healthcare provider can help. Treatment may include:    Rest and gentle exercise. This is done to increase range of motion. One common exercise is to apply pressure to the jaw and resist the movement (isometric exercise).    A cold pack. This eases swelling and reduces pain. A cold pack may be applied for 10 to 20 minutes. Repeat 3 or 4 times a day. To make a cold pack, put ice cubes in a plastic bag that seals at the top. Wrap the bag in a clean, thin towel or cloth. Never put ice or a cold pack directly on the skin.    Massage and gentle manipulation.  As described above.     Date Last Reviewed: 8/1/2017 2000-2017 [employer]. [address] [place] [address], [place] [address]. All rights reserved. This information is not intended as a substitute for professional medical care. Always follow your healthcare professional's  instructions.

## 2021-06-18 NOTE — LETTER
Letter by Viviana Hope MD at      Author: Viviana Hope MD Service: -- Author Type: --    Filed:  Encounter Date: 1/25/2019 Status: (Other)       Ruthy David  [address]  [place] [address]             January 28, 2019         Dear MsAnamaria Frankie,    Below are the results from your recent visit:    Resulted Orders   Gynecologic Cytology (PAP Smear)   Result Value Ref Range    Case Report       Gynecologic Cytology Report                       Case: [id number]                                   Authorizing Provider:  Viviana Hope MD    Collected:           01/22/2019 1011              Ordering Location:     [facility]   Received:            01/23/2019 1011                                     Internal Medicine                                                            First Screen:          Charline Hubbard CT                                                                           (ASCP)                                                                       Rescreen:              Rina May CT                                                                            (ASCP)                                                                       Specimen:    SUREPATH PAP, SCREENING, Endocervical/cervical                                             Interpretation  Negative for squamous intraepithelial lesion or malignancy.      Negative for squamous intraepithelial lesion or malignancy    Result Flag Normal Normal    Other Findings       Fungal organisms morphologically consistent with Candida spp    Specimen Adequacy       Satisfactory for evaluation, endocervical/transformation zone component absent    HPV Reflex? Yes if Abnormal     HIGH RISK No     LMP/Menopause Date one month ago     Abnormal Bleeding No     Pt Status NA     Birth Control/Hormones None     Previous Normal/Date unknown     Prev Abn Date/Dx unknown     Cervical Appearance  normal    Chlamydia trachomatis & Neisseria gonorrhoeae, Amplified Detection   Result Value Ref Range    Chlamydia trachomatis, Amplified Detection Negative Negative    Neisseria gonorrhoeae, Amplified Detection Negative Negative     Your pap smear and STI testing were completely normal and negative         Please call with questions or contact us using [employer].  It was a pleasure to see you in the office the other day.    Sincerely,        Electronically signed by Viviana Hope MD

## 2021-06-18 NOTE — LETTER
Letter by Viviana Hope MD at      Author: Viviana Hope MD Service: -- Author Type: --    Filed:  Encounter Date: 1/25/2019 Status: (Other)       Ruthy David  [address]  [place] [address]             January 25, 2019         Dear Ms. David,    Below are the results from your recent visit:    Resulted Orders   Chlamydia trachomatis & Neisseria gonorrhoeae, Amplified Detection   Result Value Ref Range    Chlamydia trachomatis, Amplified Detection Negative Negative    Neisseria gonorrhoeae, Amplified Detection Negative Negative       Your screening sexually transmitted tests were negative .    Please call with questions or contact us using [employer].    Sincerely,        Electronically signed by Viviana Hope MD

## 2021-06-18 NOTE — LETTER
Letter by Viviana Hope MD at      Author: Viviana Hope MD Service: -- Author Type: --    Filed:  Encounter Date: 2/19/2019 Status: (Other)       02/19/19      Ruthy [facility] [place]  [address]   [place] [address]    Dear Ruthy,    As a valued [facility] patient, your healthcare needs are our priority. We are contacting you in regards to an appointment with [facility]. Our clinic records indicate that they have attempted to contact you to schedule the appointment, but have not heard back from you. To prevent further delays in your care please contact their office to schedule your appointment as soon as possible at [phone]. If you have any questions, please contact us at [phone].      Sincerely,  Specialty Scheduling  [facility]

## 2021-06-20 ENCOUNTER — HEALTH MAINTENANCE LETTER (OUTPATIENT)
Age: 26
End: 2021-06-20

## 2021-06-20 NOTE — LETTER
Letter by Schlatter, Sarah, RN at      Author: Schlatter, Sarah, RN Service: -- Author Type: --    Filed:  Encounter Date: 7/26/2020 Status: (Other)       7/26/2020        Ruthy David  [address]  [place] [address]    2019-nCOV   Date Value Ref Range Status   07/24/2020 Not Detected  Final     Comment:     Collection of multiple specimens from the same patient may be necessary to  detect the virus. The possibility of a false negative should be considered if  the patient's recent exposure or clinical presentation suggests 2019 nCOV  infection and diagnostic tests for other causes of illness are negative. Repeat  testing may be considered in this setting.  Viral RNA was extracted via a validated method and subsequently underwent  single step reverse transcriptase-real time polymerase chain reaction using  primers to the CDC specified N1,N2 gene targets of CoV2 and human RNP as an  internal control.  A negative result does not rule out the presence of real-time PCR inhibitors in  the specimen or COVID-19 RNA in concentrations below the limit of detection of  the assay. The possibility of a false negative should be considered if the  patients recent exposure or clinical presentation suggests COVID-19. Additional  testing or repeat testing requires consultation with the laboratory.  Nasopharyngeal specimen is the preferred choice for swab-based SARS CoV2  testing. When collection of a nasopharyngeal swab is not possible the following  are acceptable alternatives:  an oropharyngeal (OP) specimen collected by a healthcare professional, or a  nasal mid-turbinate (NMT) swab collected by a healthcare professional or by  onsite self-collection (using a flocked tapered swab), or an anterior nares  specimen collected by a healthcare professional or by onsite self-collection  (using a round foam swab). (Centers for Disease Control)  Testing performed by [facility], Room  8[address]872, [address] [place] [id number]. This test was developed and its  performance characteristics determined by the [facility]. It has not been cleared or approved by the FDA.  The laboratory is regulated under the Clinical Laboratory Improvement  Amendments of 1988 (CLIA-88) as qualified to perform high-complexity testing.  This test is used for clinical purposes. It should not be regarded as  investigational or for research.    Performed and/or entered by:  [place] [address]        No results found for: [id number]    This letter provides a written record that you were tested for COVID-19.    Your result was negative. This means that we didnt find the virus that causes COVID-19 in your sample. A test may show negative when you do actually have the virus. This can happen when the virus is in the early stages of infection, before you feel illness symptoms.    If you have symptoms   Stay home and away from others (self-isolate) until you meet ALL of the guidelines below:    Youve had no fever--and no medicine that reduces fever--for 3 full days (72 hours). And ?    Your other symptoms have gotten better. For example, your cough or breathing has improved. And?    At least 10 days have passed since your symptoms started.    During this time:    Stay home. Dont go to work, school or anywhere else.     Stay in your own room, including for meals. Use your own bathroom if you can.    Stay away from others in your home. No hugging, kissing or shaking hands. No visitors.    Clean high touch surfaces often (doorknobs, counters, handles, etc.). Use a household cleaning spray or wipes. You can find a full list on the EPA website at www.epa.gov/pesticide-registration/list-n-disinfectants-use-against-sars-cov-2.    Cover your mouth and nose with a mask, tissue or washcloth to avoid spreading germs.    Wash your hands and  face often with soap and water.    Going back to work  Check with your employer for any guidelines to follow for going back to work.    Employers: This document serves as formal notice that your employee tested negative for COVID-19, as of the testing date shown above.

## 2021-06-21 NOTE — PROGRESS NOTES
Office Visit - [facility] and  [facility]    Ruthy Palmerland   23 y.o. female    Date of Visit: 10/29/2018    Chief Complaint   Patient presents with     [facility]     Cough     Has had a cold x 2 weeks, cough wakes her up, having to use inhaler, prodcutive, green/yellow phlem, more tired than usual     Diarrhea     x 1 week, liquid stools        Assessment and Plan   1. Moderate episode of recurrent major depressive disorder (H)  Continues to have significant symptoms . With some suicidal ideation but NO active plan. No h/o parasuicide . Does have some episodes of agitation and irritability but no  [place] r hypomania . Has been on meds for more than ten years with no real improvement in symptoms . Is open to CBT will refer to psychotherapy . Her work environment is stressful . Feels depressed with current political climate   - Ambulatory referral to Psychology    2. Mild intermittent asthma without complication  Very mild rarely uses inhaler has had to use it much more now     3. URTI (acute upper respiratory infection)  2 week h/o,  purulent discharge on exam indicates possible sinus infection . Will treat with zpack .has no h/o multiple antibiotic use     Patient is should get a full physical with Pap smear she has never had one before she will reschedule that at a later time I also recommend she get the flu shot because she is afebrile right now.      No Follow-up on file.     History of Present Illness   This 23 y.o. old pleasant young woman who was recently moved from [place].  This is a  for refugee settlement.  Has a history of very mild asthma she says she rarely uses the albuterol.  Has a 2-week history of a cold that started with sore throat congestion and some diarrhea.  She is coughing.  And does struggle to breathe a little bit which is helped marginally with the albuterol inhaler.  She is also producing greenish yellow copious purulent phlegm with her cough she is  "also feeling more more tired.  No exposure to anybody with whooping cough or tuberculosis or influenza that she is aware of.    Review of Systems: A comprehensive review of systems was negative except as noted.     Medications, Allergies and Problem List   Reviewed, reconciled and updated     Physical Exam   General Appearance:       BP 96/64 (Patient Site: Right Arm, Patient Position: Sitting, Cuff Size: Adult Large)  Pulse 80  Ht 5' 4\" (1.626 m)  Wt 186 lb (84.4 kg)  LMP Comment: IUD  SpO2 98%  BMI 31.93 kg/m2    General appearance - alert, well appearing, and in no distress  Mental status - alert, oriented to person, place, and time  Neck - supple, no significant adenopathy  Lymphatics - no palpable lymphadenopathy, no hepatosplenomegaly  Chest - clear to auscultation, no wheezes, rales or rhonchi, symmetric air entry  Heart - normal rate, regular rhythm, normal S1, S2, no murmurs, rubs, clicks or gallops  Abdomen - soft, nontender, nondistended, no masses or organomegaly  Breasts - breasts appear normal, no suspicious masses, no skin or nipple changes or axillary nodes  Pelvic - normal external genitalia, vulva, vagina, cervix, uterus and adnexa, PAP: Pap smear done today  Musculoskeletal - no joint tenderness, deformity or swelling  Extremities - peripheral pulses normal, no pedal edema, no clubbing or cyanosis  Skin - normal coloration and turgor, no rashes, no suspicious skin lesions noted                                                                                       Additional Information   Current Outpatient Prescriptions   Medication Sig Dispense Refill     albuterol (PROAIR HFA;PROVENTIL HFA;VENTOLIN HFA) 90 mcg/actuation inhaler Inhale 2 puffs every 6 (six) hours as needed for wheezing.       azithromycin (ZITHROMAX Z-CAMMY) 250 MG tablet Take 2 tablets (500 mg) on  Day 1,  followed by 1 tablet (250 mg) once daily on Days 2 through 5. 6 tablet 0     buPROPion (WELLBUTRIN XL) 150 MG 24 hr " tablet Take 150 mg by mouth daily.       citalopram (CELEXA) 20 MG tablet Take 20 mg by mouth daily.       No current facility-administered medications for this visit.      No Known Allergies  Social History   Substance Use Topics     Smoking status: Former Smoker     Smokeless tobacco: Never Used     Alcohol use None       Time: total time spent with the patient was 25 minutes of which >50% was spent in counseling and coordination of care     Viviana Hope MD

## 2021-06-23 NOTE — TELEPHONE ENCOUNTER
Medication Request  Medication name: bupropion 150 mg XL  Pharmacy Name and Location: [facility]  Reason for request: Patient stated she would like a refill until she sees Dr. Hope on 1/22/19.  When did you use medication last?:  Yesterday  Okay to leave a detailed message: yes  [phone]

## 2021-06-23 NOTE — TELEPHONE ENCOUNTER
I refilled it . Please confirm if she has stopped celexa or is still taking it and then remove it from her med list if she stopped it thanks

## 2021-06-23 NOTE — PROGRESS NOTES
Office Visit - Physical   Ruthy ZHU Morven   23 y.o.  female    Date of visit: 1/22/2019  Physician: Viviana Hope MD     Assessment and Plan   1. TMJ (temporomandibular joint syndrome)  Some chronic pain /no dislocation.  Did print out some exercises heat local Aspercreme ibuprofen.  She could use a mouthguard at night.  Referral to the TMG program to see if she would be eligible for some sessions.  - [facility] to [facility]    2. Moderate episode of recurrent major depressive disorder (H)  Still clinically depressed.  Still having suicidal ideation.  She is aware of the 24-hour hotline to call.  Again she has no exact plan it is more of way of coping with stressful thoughts that she feels that if she were dead she would not have to deal with them but she has actually no desire to die.  Continues to have good relationships with her family very poor very supportive and relationship with her boyfriend.  Her change in job has made her feel very hopeful.  She has switched off social media and that helps.  She is going to continue follow-up with [facility] and they are going to give her an official diagnosis of there at their intake and continue CBT with them I do not need to see her again unless there is a problem because she is going to go through with comprehensive psychotherapy    3. Routine history and physical examination of adult  Pap smear done today she declines any labs she needs annual gonorrhea chlamydia testing she is a blood donor so she is probably low risk for HIV testing.  Immunizations are up-to-date return in one year    4. Costochondritis     Very minimal.  Conservative care recommended instructions printed out.    No Follow-up on file.     Chief Complaint   Chief Complaint   Patient presents with     Annual Exam     PX, occ left side chest pain (at top breast area) when bending forward        Patient Profile   Social History     Social History Narrative     for  refugee resettlement.  Now has a new job with sexual trafficking victims.  Is in a stable monogamous relationship with a boyfriend for 3 years.  Parents live an hour away and she is very close to her family.        Past Medical History   No past medical history on file.  PROBLEM LIST  Patient Active Problem List   Diagnosis     Asthma     Moderate episode of recurrent major depressive disorder (H)         Past Surgical History  She has no past surgical history on file.     History of Present Illness   This 23 y.o. old very pleasant young woman suffers from moderate depression here for her physical.  She has some left upper chest wall pain that is typical for costochondritis it is tender and onlyn felt when she bends down or presses on it hard around the breast plate at the rib.  She has had no injury or fall.  He still has significant suicidal ideation and depression symptoms, she is on 2 SSRIs has been going for psych intake and is going to receive CBT    Review of Systems: A comprehensive review of systems was negative except as noted.     Medications and Allergies   Current Outpatient Medications   Medication Sig Dispense Refill     albuterol (PROAIR HFA;PROVENTIL HFA;VENTOLIN HFA) 90 mcg/actuation inhaler Inhale 2 puffs every 6 (six) hours as needed for wheezing.       buPROPion (WELLBUTRIN XL) 150 MG 24 hr tablet Take 1 tablet (150 mg total) by mouth daily. 90 tablet 3     citalopram (CELEXA) 20 MG tablet Take 20 mg by mouth daily.       No current facility-administered medications for this visit.      No Known Allergies     Family and Social History   Family History   Problem Relation Age of Onset     Breast cancer Mother      Breast cancer Maternal Grandmother         Social History     Tobacco Use     Smoking status: Former Smoker     Smokeless tobacco: Never Used   Substance Use Topics     Alcohol use: Not on file     Drug use: Yes     Types: Marijuana     Comment: uses it to help fight her chronic  "depression      Social History     Social History Narrative     for refugee resettlement.  Now has a new job with sexual trafficking victims.  Is in a stable monogamous relationship with a boyfriend for 3 years.  Parents live an hour away and she is very close to her family.        Physical Exam   General Appearance:      /72 (Patient Site: Left Arm, Patient Position: Sitting, Cuff Size: Adult Regular)   Pulse 83   Ht 5' 5.5\" (1.664 m)   Wt 190 lb (86.2 kg)   LMP 12/25/2018 (Approximate)   SpO2 97%   BMI 31.14 kg/m        NECK: Neck appearance was normal. There were no neck masses and the thyroid was not enlarged.  RESPIRATORY: Breathing pattern was normal and the chest moved symmetrically.  Percussion/auscultatory percussion was normal.  Lung sounds were normal and there were no abnormal sounds.  CARDIOVASCULAR: Heart rate and rhythm were normal.  S1 and S2 were normal and there were no extra sounds or murmurs. Peripheral pulses in arms and legs were normal.  Jugular venous pressure was normal.  There was no peripheral edema.  GASTROINTESTINAL: The abdomen was normal in contour.  Bowel sounds were present.  Percussion detected no organ enlargement or tenderness.  Palpation detected no tenderness, mass, or enlarged organs.   MUSCULOSKELETAL: Skeletal configuration was normal and muscle mass was normal for age. Joint appearance was overall normal.  LYMPHATIC: There were no enlarged nodes.  SKIN/HAIR/NAILS: Skin color was normal.  There were no skin lesions.  Hair and nails were normal.  Few scattered benign moles  NEUROLOGIC: The patient was alert and oriented to person, place, time, and circumstance. Speech was normal. Cranial nerves were normal. Motor strength was normal for age. The patient was normally coordinated.  PSYCHIATRIC:  Mood and affect were normal and the patient had normal recent and remote memory. The patient's judgment and insight were normal.    ADDITIONAL VITAL SIGNS: "   CHEST WALL/BREASTS: No breast lumps symmetrical breast    GENITAL/URINARY: Pelvic: cervix normal in appearance, external genitalia normal, no adnexal masses or tenderness, no cervical motion tenderness, rectovaginal septum normal, uterus normal size, shape, and consistency and vagina normal without discharge.  IUD string visible.  Pap smear and GC swab taken       Additional Information        Viviana Hope MD  Internal Medicine  Contact me at None

## 2021-06-23 NOTE — TELEPHONE ENCOUNTER
Patient Returning Call  Reason for call:  Patient   Information relayed to patient:  Patient is still taking celexa 20 mg but does not need a refill at this time.   Patient has additional questions:  No  If YES, what are your questions/concerns:    Okay to leave a detailed message?: No call back needed

## 2021-06-23 NOTE — TELEPHONE ENCOUNTER
[phone] (home)       Left message to call back for: confirmation and update of discontinuation of a medicaiton   Information to relay to patient:  See provider msg below and update chart based on pt information     Adenike Lopez CMA ([facility]) 12:00 PM

## 2021-07-03 NOTE — ADDENDUM NOTE
Addendum Note by Kamini Spann MD at 1/22/2019  9:00 AM     Author: Kamini Spann MD Service: -- Author Type: Physician    Filed: 1/23/2019  9:32 AM Encounter Date: 1/22/2019 Status: Signed    : Kamini Spann MD (Physician)    Addended by: KAMINI SPANN on: 1/23/2019 09:32 AM        Modules accepted: Orders

## 2021-07-03 NOTE — ADDENDUM NOTE
Addendum Note by Love Skaggs MD at 9/15/2020  9:40 AM     Author: Love Skaggs MD Service: -- Author Type: Physician    Filed: 9/15/2020  2:04 PM Encounter Date: 9/15/2020 Status: Signed    : Love Skaggs MD (Physician)    Addended by: LOVE SKAGGS on: 9/15/2020 02:04 PM        Modules accepted: Orders

## 2021-10-11 ENCOUNTER — HEALTH MAINTENANCE LETTER (OUTPATIENT)
Age: 26
End: 2021-10-11

## 2022-01-04 ENCOUNTER — E-VISIT (OUTPATIENT)
Dept: URGENT CARE | Facility: URGENT CARE | Age: 27
End: 2022-01-04
Payer: COMMERCIAL

## 2022-01-04 DIAGNOSIS — J02.9 SORE THROAT: ICD-10-CM

## 2022-01-04 DIAGNOSIS — Z20.822 SUSPECTED COVID-19 VIRUS INFECTION: ICD-10-CM

## 2022-01-04 PROCEDURE — 99421 OL DIG E/M SVC 5-10 MIN: CPT | Performed by: PHYSICIAN ASSISTANT

## 2022-01-04 NOTE — PATIENT INSTRUCTIONS
Ruthy,    Your symptoms show that you may have coronavirus (COVID-19). This illness can cause fever, cough and trouble breathing. Many people get a mild case and get better on their own. Some people can get very sick.    Because you also reported sore throat, I would like to also test you for Strep Throat to determine if we need to treat you for that as well.    What should I do?  We would like to test you for COVID-19 virus and Strep Throat. I have placed orders for these tests. To schedule: go to your [employer] home page and scroll down to the section that says  You have an appointment that needs to be scheduled  and click the large green button that says  Schedule Now  and follow the steps to find the next available openings. It is important that when you are asked what the reason for your appointment is that you mention you need BOTH COVID and Strep tests.    If you are unable to complete these [employer] scheduling steps, please call [phone] to schedule your testing.     Return to work/school/ guidance:   Please let your workplace manager and staffing office know when your isolation ends.       If you receive a positive COVID-19 test result, follow the guidance of the those who are giving you the results. Usually the return to work is 10 days from symptom onset or positive test date (or in some cases 20 days if you are immunocompromised). If your symptoms started after your positive test, the 10 days should start when your symptoms started.   o If you work at [facility], you must also be cleared by Employee Occupational Health and Safety to return to work.      If you receive a negative COVID-19 test result and did not have a high risk exposure to someone with a known positive COVID-19 test, you can return to work once you're free of fever for 24 hours without fever-reducing medication and your symptoms are improving or resolved.    If you receive a negative COVID-19 test and if you had a high  risk exposure to someone who has tested positive for COVID-19 then you can return to work 14 days after your last contact with the positive individual. Follow quarantine guidance given by your doctor or public health officials.    Sign up for [employer].   We know it's scary to hear that you might have COVID-19. We want to track your symptoms to make sure you're okay over the next 2 weeks. Please look for an email from [employer]--this is a free, online program that we'll use to keep in touch. To sign up, follow the link in the email you will receive. Learn more at [email or web address]    How can I take care of myself?  Over the counter medications may help with your symptoms like congestion, cough, chills, or fever.    There are not many effective prescription treatments for early COVID-19. Hydroxychloroquine, ivermectin, and azithromycin are not effective or recommended for COVID-19.    If your symptoms started in the last 10 days, you may be able to receive a treatment with monoclonal antibodies. This treatment can lower your risk of severe illness and going to the hospital. It is given through an IV or under your skin (subcutaneous) and must be given at an infusion center. You must be 12 or older, weight at least 88 pounds, and have a positive COVID-19 test.      If you would like to sign up to be considered to receive the monoclonal antibody medicine, please complete a participation form through the [facility] here: MNRAP ([email or web address]). You may also call the [facility] COVID-19 Public Hotline at 1-936.537.9733 (open Mon-Fri: 9am-7pm and Sat: 10am-6pm).     Not all people who are eligible will receive the medicine, since supply is limited. You will be contacted in the next 1 to 2 business days only if you are selected. If you do not receive a call, you have not been selected to receive the medicine. If you have any questions about  this medication, please contact your primary care provider. For more information, see [email or web address]      Get lots of rest. Drink extra fluids (unless a doctor has told you not to)    Take Tylenol (acetaminophen) or ibuprofen for fever or pain. If you have liver or kidney problems, ask your family doctor if it's okay to take Tylenol or ibuprofen    Take over the counter medications for your symptoms, as directed by your doctor. You may also talk to your pharmacist.      If you have other health problems (like cancer, heart failure, an organ transplant or severe kidney disease): Call your specialty clinic if you don't feel better in the next 2 days.    Know when to call 911. Emergency warning signs include:  o Trouble breathing or shortness of breath  o Pain or pressure in the chest that doesn't go away  o Feeling confused like you haven't felt before, or not being able to wake up  o Bluish-colored lips or face    Where can I get more information?    [facility] - About COVID-19: [email or web address]     CDC - What to Do If You're Sick:   www.cdc.gov/coronavirus/2019-ncov/about/steps-when-sick.html    CDC - Ending Home Isolation:  https://www.cdc.gov/coronavirus/2019-ncov/your-health/quarantine-isolation.html    CDC - Caring for Someone:  www.cdc.gov/coronavirus/2019-ncov/if-you-are-sick/care-for-someone.html    [facility] clinical trials (COVID-19 research studies): clinicalaffairs.[facility]/n-clinical-trials    Below are the COVID-19 hotlines at the [facility] ([facility]). Interpreters are available.  o For health questions: Call 751-436-6361 or 1-225.210.3810 (7 a.m. to 7 p.m.)  o For questions about schools and childcare: Call 151-828-1280 or 1-626.794.5429 (7 a.m. to 7 p.m.)  January 4, 2022  RE:  Ruthy David                                                                                                                   [address] [place] [address]      To whom it may concern:    I evaluated Ruthy David on January 4, 2022. Ruthy David should be excused from work/school.     They should let their workplace manager and staffing office know when their quarantine ends.    We can not give an exact date as it depends on the information below. They can calculate this on their own or work with their manager/staffing office to calculate this. (For example if they were exposed on 10/04, they would have to quarantine for 14 full days. That would be through 10/18. They could return on 10/19.)    Quarantine Guidelines:    If patient receives a positive COVID-19 test result, they should follow the guidance of those who are giving the results. Usually the return to work is 10 (or in some cases 20 days from symptom onset.) If they work at [employer], they must be cleared by Employee Occupational Health and Safety to return to work.      If patient receives a negative COVID-19 test result and did not have a high risk exposure to someone with a known positive COVID-19 test, they can return to work once they're free of fever for 24 hours without fever-reducing medication and their symptoms are improving or resolved.    If patient receives a negative COVID-19 test and if they had a high risk exposure to someone who has tested positive for COVID-19 then they can return to work 14 days after their last contact with the positive individual    Note: If there is ongoing exposure to the covid positive person, this quarantine period may be longer than 14 days. (For example, if they are continually exposed to their child, who tested positive and cannot isolate from them, then the quarantine of 7-14 days can't start until their child is no longer contagious. This is typically 10 days from onset to the child's symptoms. So the total duration may be 17-24 days in this case.)     Sincerely,  Sabino Bell,  MEHREEN          o

## 2022-02-22 RX ORDER — MIRTAZAPINE 7.5 MG/1
TABLET, FILM COATED ORAL
COMMUNITY
Start: 2021-04-16 | End: 2022-02-23

## 2022-02-23 ENCOUNTER — OFFICE VISIT (OUTPATIENT)
Dept: FAMILY MEDICINE | Facility: CLINIC | Age: 27
End: 2022-02-23
Payer: COMMERCIAL

## 2022-02-23 VITALS
HEART RATE: 97 BPM | DIASTOLIC BLOOD PRESSURE: 76 MMHG | BODY MASS INDEX: 32.54 KG/M2 | SYSTOLIC BLOOD PRESSURE: 118 MMHG | WEIGHT: 202.5 LBS | TEMPERATURE: 98.6 F | HEIGHT: 66 IN

## 2022-02-23 DIAGNOSIS — Z11.59 NEED FOR HEPATITIS C SCREENING TEST: ICD-10-CM

## 2022-02-23 DIAGNOSIS — Z11.4 SCREENING FOR HIV (HUMAN IMMUNODEFICIENCY VIRUS): ICD-10-CM

## 2022-02-23 DIAGNOSIS — Z00.00 ADULT GENERAL MEDICAL EXAM: Primary | ICD-10-CM

## 2022-02-23 DIAGNOSIS — Z12.4 CERVICAL CANCER SCREENING: ICD-10-CM

## 2022-02-23 DIAGNOSIS — N89.8 VAGINAL DISCHARGE: ICD-10-CM

## 2022-02-23 DIAGNOSIS — H54.7 VISION PROBLEMS: ICD-10-CM

## 2022-02-23 LAB
ALBUMIN SERPL-MCNC: 4 G/DL (ref 3.5–5)
ALP SERPL-CCNC: 70 U/L (ref 45–120)
ALT SERPL W P-5'-P-CCNC: 19 U/L (ref 0–45)
ANION GAP SERPL CALCULATED.3IONS-SCNC: 10 MMOL/L (ref 5–18)
AST SERPL W P-5'-P-CCNC: 17 U/L (ref 0–40)
BILIRUB SERPL-MCNC: 0.3 MG/DL (ref 0–1)
BUN SERPL-MCNC: 8 MG/DL (ref 8–22)
C REACTIVE PROTEIN LHE: 0.4 MG/DL (ref 0–0.8)
CALCIUM SERPL-MCNC: 9.7 MG/DL (ref 8.5–10.5)
CHLORIDE BLD-SCNC: 106 MMOL/L (ref 98–107)
CHOLEST SERPL-MCNC: 210 MG/DL
CLUE CELLS: ABNORMAL
CO2 SERPL-SCNC: 23 MMOL/L (ref 22–31)
CREAT SERPL-MCNC: 0.81 MG/DL (ref 0.6–1.1)
ERYTHROCYTE [DISTWIDTH] IN BLOOD BY AUTOMATED COUNT: 13.3 % (ref 10–15)
ERYTHROCYTE [SEDIMENTATION RATE] IN BLOOD BY WESTERGREN METHOD: 10 MM/HR (ref 0–20)
FASTING STATUS PATIENT QL REPORTED: ABNORMAL
GFR SERPL CREATININE-BSD FRML MDRD: >90 ML/MIN/1.73M2
GLUCOSE BLD-MCNC: 94 MG/DL (ref 70–125)
HBA1C MFR BLD: 5 % (ref 0–5.6)
HCT VFR BLD AUTO: 42 % (ref 35–47)
HCV AB SERPL QL IA: NONREACTIVE
HDLC SERPL-MCNC: 66 MG/DL
HGB BLD-MCNC: 13.5 G/DL (ref 11.7–15.7)
HIV 1+2 AB+HIV1 P24 AG SERPL QL IA: NEGATIVE
LDLC SERPL CALC-MCNC: 126 MG/DL
MCH RBC QN AUTO: 29 PG (ref 26.5–33)
MCHC RBC AUTO-ENTMCNC: 32.1 G/DL (ref 31.5–36.5)
MCV RBC AUTO: 90 FL (ref 78–100)
PLATELET # BLD AUTO: 347 10E3/UL (ref 150–450)
POTASSIUM BLD-SCNC: 5.1 MMOL/L (ref 3.5–5)
PROT SERPL-MCNC: 6.9 G/DL (ref 6–8)
RBC # BLD AUTO: 4.65 10E6/UL (ref 3.8–5.2)
SODIUM SERPL-SCNC: 139 MMOL/L (ref 136–145)
TRICHOMONAS, WET PREP: ABNORMAL
TRIGL SERPL-MCNC: 89 MG/DL
TSH SERPL DL<=0.005 MIU/L-ACNC: 2.25 UIU/ML (ref 0.3–5)
VIT B12 SERPL-MCNC: 262 PG/ML (ref 213–816)
WBC # BLD AUTO: 7 10E3/UL (ref 4–11)
WBC'S/HIGH POWER FIELD, WET PREP: ABNORMAL
YEAST, WET PREP: PRESENT

## 2022-02-23 PROCEDURE — 36415 COLL VENOUS BLD VENIPUNCTURE: CPT | Performed by: FAMILY MEDICINE

## 2022-02-23 PROCEDURE — 80061 LIPID PANEL: CPT | Performed by: FAMILY MEDICINE

## 2022-02-23 PROCEDURE — 85027 COMPLETE CBC AUTOMATED: CPT | Performed by: FAMILY MEDICINE

## 2022-02-23 PROCEDURE — 87491 CHLMYD TRACH DNA AMP PROBE: CPT | Performed by: FAMILY MEDICINE

## 2022-02-23 PROCEDURE — G0123 SCREEN CERV/VAG THIN LAYER: HCPCS | Performed by: FAMILY MEDICINE

## 2022-02-23 PROCEDURE — 99395 PREV VISIT EST AGE 18-39: CPT | Mod: 25 | Performed by: FAMILY MEDICINE

## 2022-02-23 PROCEDURE — 80053 COMPREHEN METABOLIC PANEL: CPT | Performed by: FAMILY MEDICINE

## 2022-02-23 PROCEDURE — 84443 ASSAY THYROID STIM HORMONE: CPT | Performed by: FAMILY MEDICINE

## 2022-02-23 PROCEDURE — 82306 VITAMIN D 25 HYDROXY: CPT | Performed by: FAMILY MEDICINE

## 2022-02-23 PROCEDURE — 87389 HIV-1 AG W/HIV-1&-2 AB AG IA: CPT | Performed by: FAMILY MEDICINE

## 2022-02-23 PROCEDURE — 85652 RBC SED RATE AUTOMATED: CPT | Performed by: FAMILY MEDICINE

## 2022-02-23 PROCEDURE — 87591 N.GONORRHOEAE DNA AMP PROB: CPT | Performed by: FAMILY MEDICINE

## 2022-02-23 PROCEDURE — 83036 HEMOGLOBIN GLYCOSYLATED A1C: CPT | Performed by: FAMILY MEDICINE

## 2022-02-23 PROCEDURE — 90471 IMMUNIZATION ADMIN: CPT | Performed by: FAMILY MEDICINE

## 2022-02-23 PROCEDURE — 86803 HEPATITIS C AB TEST: CPT | Performed by: FAMILY MEDICINE

## 2022-02-23 PROCEDURE — 90686 IIV4 VACC NO PRSV 0.5 ML IM: CPT | Performed by: FAMILY MEDICINE

## 2022-02-23 PROCEDURE — 87624 HPV HI-RISK TYP POOLED RSLT: CPT | Performed by: FAMILY MEDICINE

## 2022-02-23 PROCEDURE — G0124 SCREEN C/V THIN LAYER BY MD: HCPCS | Performed by: PATHOLOGY

## 2022-02-23 PROCEDURE — 87210 SMEAR WET MOUNT SALINE/INK: CPT | Performed by: FAMILY MEDICINE

## 2022-02-23 PROCEDURE — 86140 C-REACTIVE PROTEIN: CPT | Performed by: FAMILY MEDICINE

## 2022-02-23 PROCEDURE — 82607 VITAMIN B-12: CPT | Performed by: FAMILY MEDICINE

## 2022-02-23 RX ORDER — POLYETHYLENE GLYCOL 3350 17 G
POWDER IN PACKET (EA) ORAL
COMMUNITY
Start: 2022-02-02 | End: 2023-08-23

## 2022-02-23 RX ORDER — BUPROPION HYDROCHLORIDE 100 MG/1
100 TABLET, EXTENDED RELEASE ORAL 2 TIMES DAILY
COMMUNITY
Start: 2022-01-17 | End: 2022-02-23

## 2022-02-23 ASSESSMENT — ASTHMA QUESTIONNAIRES
QUESTION_4 LAST FOUR WEEKS HOW OFTEN HAVE YOU USED YOUR RESCUE INHALER OR NEBULIZER MEDICATION (SUCH AS ALBUTEROL): NOT AT ALL
ACT_TOTALSCORE: 25
ACT_TOTALSCORE: 25
QUESTION_5 LAST FOUR WEEKS HOW WOULD YOU RATE YOUR ASTHMA CONTROL: COMPLETELY CONTROLLED
QUESTION_1 LAST FOUR WEEKS HOW MUCH OF THE TIME DID YOUR ASTHMA KEEP YOU FROM GETTING AS MUCH DONE AT WORK, SCHOOL OR AT HOME: NONE OF THE TIME
QUESTION_3 LAST FOUR WEEKS HOW OFTEN DID YOUR ASTHMA SYMPTOMS (WHEEZING, COUGHING, SHORTNESS OF BREATH, CHEST TIGHTNESS OR PAIN) WAKE YOU UP AT NIGHT OR EARLIER THAN USUAL IN THE MORNING: NOT AT ALL
QUESTION_2 LAST FOUR WEEKS HOW OFTEN HAVE YOU HAD SHORTNESS OF BREATH: NOT AT ALL

## 2022-02-23 ASSESSMENT — PATIENT HEALTH QUESTIONNAIRE - PHQ9
SUM OF ALL RESPONSES TO PHQ QUESTIONS 1-9: 7
SUM OF ALL RESPONSES TO PHQ QUESTIONS 1-9: 7
10. IF YOU CHECKED OFF ANY PROBLEMS, HOW DIFFICULT HAVE THESE PROBLEMS MADE IT FOR YOU TO DO YOUR WORK, TAKE CARE OF THINGS AT HOME, OR GET ALONG WITH OTHER PEOPLE: SOMEWHAT DIFFICULT

## 2022-02-23 NOTE — PROGRESS NOTES
SUBJECTIVE:   CC: Ruthy David is an 26 year old woman who presents for preventive health visit.         PHQ-9 score:    PHQ 2/23/2022   PHQ-9 Total Score 7   Q9: Thoughts of better off dead/self-harm past 2 weeks Several days   F/U: Thoughts of suicide or self-harm Yes   F/U: Self harm-plan No   F/U: Self-harm action No   F/U: Safety concerns No     This is a lifelong problem - has a safety plan - works with therapist/psychiatrist -   Never been hospitalized        Patient has been advised of split billing requirements and indicates understanding: Yes  Had an eye appointment a couple weeks ago - thought too much change in short period of time  Struggles with mental health - sees   Bupropion 150 mg XL, Duloxetine 60 mg daily  Nicotine lozenges - trying to quit vaping -   Smoked age [age], then vaping sporadically, then pandemic hit, and did more vaing than usual  Now 1 month without vaping  Using Nicotine lozenges - 2-1/2 per day    Has IUD - Mirena - September 2020 or 21?  Uses inhaler last about 2 years ago           Healthy Habits:     Getting at least 3 servings of Calcium per day:  NO    Bi-annual eye exam:  Yes    Dental care twice a year:  NO    Sleep apnea or symptoms of sleep apnea:  None    Diet:  Regular (no restrictions)    Frequency of exercise:  2-3 days/week    Duration of exercise:  15-30 minutes    Taking medications regularly:  Yes    Medication side effects:  None    PHQ-2 Total Score: 4    Additional concerns today:  Yes      Today's PHQ-2 Score:   PHQ-2 ( 1999 [employer]) 2/23/2022   Q1: Little interest or pleasure in doing things 2   Q2: Feeling down, depressed or hopeless 2   PHQ-2 Score 4   Q1: Little interest or pleasure in doing things More than half the days   Q2: Feeling down, depressed or hopeless More than half the days   PHQ-2 Score 4       Abuse: Current or Past (Physical, Sexual or Emotional) - No  Do you feel safe in your environment? Yes    Have you ever done Advance Care  Planning? (For example, a Health Directive, POLST, or a discussion with a medical provider or your loved ones about your wishes): discussed    Social History     Tobacco Use     Smoking status: Former Smoker     Smokeless tobacco: Never Used   Substance Use Topics     Alcohol use: Yes     Comment: Alcoholic Drinks/day: Occasional         Alcohol Use 2/23/2022   Prescreen: >3 drinks/day or >7 drinks/week? No       Reviewed orders with patient.  Reviewed health maintenance and updated orders accordingly - Yes  BP Readings from Last 3 Encounters:   02/23/22 118/76   09/15/20 114/72    Wt Readings from Last 3 Encounters:   02/23/22 91.9 kg (202 lb 8 oz)   09/15/20 88 kg (194 lb 1.6 oz)   01/22/19 86.2 kg (190 lb)                  Patient Active Problem List   Diagnosis     Asthma     Moderate episode of recurrent major depressive disorder (H)     IUD (intrauterine device) in place     No past surgical history on file.    Social History     Tobacco Use     Smoking status: Former Smoker     Smokeless tobacco: Never Used   Substance Use Topics     Alcohol use: Yes     Comment: Alcoholic Drinks/day: Occasional     Family History   Problem Relation Age of Onset     Breast Cancer Mother      Breast Cancer Maternal Grandmother          Current Outpatient Medications   Medication Sig Dispense Refill     buPROPion (WELLBUTRIN XL) 150 MG 24 hr tablet [BUPROPION (WELLBUTRIN XL) 150 MG 24 HR TABLET] Take 1 tablet (150 mg total) by mouth daily. 90 tablet 0     DULoxetine (CYMBALTA) 60 MG capsule [DULOXETINE (CYMBALTA) 60 MG CAPSULE] Take 1 capsule (60 mg total) by mouth daily. 90 capsule 0     nicotine (COMMIT) 2 MG lozenge 1 LOZENGE IN MOUTH FOUR TIMES DAILY       albuterol (PROAIR HFA;PROVENTIL HFA;VENTOLIN HFA) 90 mcg/actuation inhaler [ALBUTEROL (PROAIR HFA;PROVENTIL HFA;VENTOLIN HFA) 90 MCG/ACTUATION INHALER] Inhale 2 puffs every 6 (six) hours as needed for wheezing. (Patient not taking: Reported on 2/23/2022)        levonorgestreL (MIRENA) 20 mcg/24 hours (5 yrs) 52 mg IUD [LEVONORGESTREL (MIRENA) 20 MCG/24 HOURS (5 YRS) 52 MG IUD] by Intrauterine route once for 1 dose. 1 each 0     vitamin D3 (CHOLECALCIFEROL) 1.25 MG (94770 UT) capsule Take 1 capsule (50,000 Units) by mouth once a week 13 capsule 1     No Known Allergies    Breast Cancer Screening:    FHS-7:   Breast CA Risk Assessment (FHS-7) 2022   Did any of your first-degree relatives have breast or ovarian cancer? Yes   Did any of your relatives have bilateral breast cancer? Yes   Did any man in your family have breast cancer? No   Did any woman in your family have breast and ovarian cancer? Yes   Did any woman in your family have breast cancer before age 50 y? Yes   Do you have 2 or more relatives with breast and/or ovarian cancer? Yes   Do you have 2 or more relatives with breast and/or bowel cancer? Yes       Patient under 40 years of age: Routine Mammogram Screening not recommended.   Pertinent mammograms are reviewed under the imaging tab.    History of abnormal Pap smear: NO - age 21-29 PAP every 3 years recommended  PAP / HPV Latest Ref Rng & Units 2019   PAP Negative for squamous intraepithelial lesion or malignancy. Negative for squamous intraepithelial lesion or malignancy  Electronically signed by Rina May CT (ASCP) on 2019 at  3:29 PM       Reviewed and updated as needed this visit by clinical staff   Tobacco  Allergies  Meds              Reviewed and updated as needed this visit by Provider                 No past medical history on file.   No past surgical history on file.  OB History    Para Term  AB Living   1 0 0 0 1 0   SAB IAB Ectopic Multiple Live Births   0 0 0 0 0      # Outcome Date GA Lbr [place]/2nd Weight Sex Delivery Anes PTL Lv   1 AB                Review of Systems   Psychiatric/Behavioral:        Depression - following with counselor/psychiatry              OBJECTIVE:   /76 (BP Location: Right  "arm, Patient Position: Sitting, Cuff Size: Adult Regular)   Pulse 97   Temp 98.6  F (37  C)   Ht 1.673 m (5' 5.87\")   Wt 91.9 kg (202 lb 8 oz)   BMI 32.82 kg/m    Physical Exam  Constitutional:       General: She is not in acute distress.     Appearance: She is well-developed.   HENT:      Right Ear: Tympanic membrane and external ear normal.      Left Ear: Tympanic membrane and external ear normal.      Nose: Nose normal.      Mouth/Throat:      Pharynx: No oropharyngeal exudate.   Eyes:      General:         Right eye: No discharge.         Left eye: No discharge.      Conjunctiva/sclera: Conjunctivae normal.      Pupils: Pupils are equal, round, and reactive to light.   Neck:      Thyroid: No thyromegaly.      Trachea: No tracheal deviation.   Cardiovascular:      Rate and Rhythm: Normal rate and regular rhythm.      Pulses: Normal pulses.      Heart sounds: Normal heart sounds, S1 normal and S2 normal. No murmur heard.    No friction rub. No S3 or S4 sounds.   Pulmonary:      Effort: Pulmonary effort is normal. No respiratory distress.      Breath sounds: Normal breath sounds. No wheezing or rales.   Chest:   Breasts:      Right: No mass, nipple discharge or tenderness.      Left: No mass, nipple discharge or tenderness.       Abdominal:      General: Bowel sounds are normal.      Palpations: Abdomen is soft. There is no mass.      Tenderness: There is no abdominal tenderness.   Genitourinary:     Comments: PELVIC EXAM:External genitalia: normal  Vaginal mucosa normal  Vaginal discharge: white - minimal  Speculum exam shows a normal appearing cervix .   Bimanual exam: Cervix closed, firm, non tender  to motion.  Uterus  firm, regular, mobile, non tender to palpation. No adnexal masses or tenderness.     Musculoskeletal:         General: Normal range of motion.      Cervical back: Neck supple.   Lymphadenopathy:      Cervical: No cervical adenopathy.   Skin:     General: Skin is warm and dry.      Findings: " No rash.   Neurological:      Mental Status: She is alert and oriented to person, place, and time.      Motor: No abnormal muscle tone.      Deep Tendon Reflexes: Reflexes are normal and symmetric.   Psychiatric:         Thought Content: Thought content normal.         Judgment: Judgment normal.           Diagnostic Test Results:  Labs reviewed in Epic  Results for orders placed or performed in visit on 02/23/22   HIV Antigen Antibody Combo     Status: Normal   Result Value Ref Range    HIV Antigen Antibody Combo Negative Negative   Hepatitis C Screen Reflex to HCV RNA Quant and Genotype     Status: Normal   Result Value Ref Range    Hepatitis C Antibody Nonreactive Nonreactive    Narrative    Assay performance characteristics have not been established for newborns, infants, and children.   Comprehensive metabolic panel (BMP + Alb, Alk Phos, ALT, AST, Total. Bili, TP)     Status: Abnormal   Result Value Ref Range    Sodium 139 136 - 145 mmol/L    Potassium 5.1 (H) 3.5 - 5.0 mmol/L    Chloride 106 98 - 107 mmol/L    Carbon Dioxide (CO2) 23 22 - 31 mmol/L    Anion Gap 10 5 - 18 mmol/L    Urea Nitrogen 8 8 - 22 mg/dL    Creatinine 0.81 0.60 - 1.10 mg/dL    Calcium 9.7 8.5 - 10.5 mg/dL    Glucose 94 70 - 125 mg/dL    Alkaline Phosphatase 70 45 - 120 U/L    AST 17 0 - 40 U/L    ALT 19 0 - 45 U/L    Protein Total 6.9 6.0 - 8.0 g/dL    Albumin 4.0 3.5 - 5.0 g/dL    Bilirubin Total 0.3 0.0 - 1.0 mg/dL    GFR Estimate >90 >60 mL/min/1.73m2   Lipid Profile (Chol, Trig, HDL, LDL calc)     Status: Abnormal   Result Value Ref Range    Cholesterol 210 (H) <=199 mg/dL    Triglycerides 89 <=149 mg/dL    Direct Measure HDL 66 >=50 mg/dL    LDL Cholesterol Calculated 126 <=129 mg/dL    Patient Fasting > 8hrs? Unknown    CBC with platelets     Status: Normal   Result Value Ref Range    WBC Count 7.0 4.0 - 11.0 10e3/uL    RBC Count 4.65 3.80 - 5.20 10e6/uL    Hemoglobin 13.5 11.7 - 15.7 g/dL    Hematocrit 42.0 35.0 - 47.0 %    MCV 90  78 - 100 fL    MCH 29.0 26.5 - 33.0 pg    MCHC 32.1 31.5 - 36.5 g/dL    RDW 13.3 10.0 - 15.0 %    Platelet Count 347 150 - 450 10e3/uL   Vitamin D Deficiency     Status: Abnormal   Result Value Ref Range    Vitamin D, Total (25-Hydroxy) 10 (L) 30 - 80 ug/L    Narrative    Deficiency <10.0 ug/L  Insufficiency 10.0-29.9 ug/L  Sufficiency 30.0-80.0 ug/L  Toxicity (possible) >100.0 ug/L    Vitamin B12     Status: Normal   Result Value Ref Range    Vitamin B12 262 213 - 816 pg/mL   CRP, inflammation     Status: Normal   Result Value Ref Range    CRP 0.4 0.0-<0.8 mg/dL   ESR: Erythrocyte sedimentation rate     Status: Normal   Result Value Ref Range    Erythrocyte Sedimentation Rate 10 0 - 20 mm/hr   TSH     Status: Normal   Result Value Ref Range    TSH 2.25 0.30 - 5.00 uIU/mL   Hemoglobin A1c     Status: Normal   Result Value Ref Range    Hemoglobin A1C 5.0 0.0 - 5.6 %   HPV High Risk Types DNA Cervical     Status: Abnormal   Result Value Ref Range    Other HR HPV Positive (A) Negative    HPV16 DNA Negative Negative    HPV18 DNA Negative Negative    FINAL DIAGNOSIS       This patient's sample is positive for other HR HPV DNA (types 31, 33, 35, 39, 45, 51, 52, 56, 58, 59, 66 or 68), not HPV 16 or HPV 18 DNA. This result requires clinical correlation with concurrent cytology findings.      This test was developed and its performance characteristics determined by the [facility]. It has not been cleared or approved by the FDA. The laboratory is regulated under CLIA as qualified to perform high-complexity testing. This test is used for clinical purposes. It should not be regarded as investigational or for research.    METHODOLOGY: The Roche Arnold 4800 system uses automated extraction, simultaneous amplification of HPV (L1 region) and beta-globin, followed by real time detection of fluorescent labeled HPV and beta globin using specific oligonucleotide probes. The  test specifically identified types HPV 16 DNA and HPV 18 DNA while concurrently detecting the rest of the high risk types (31, 33, 35, 39, 45, 51, 52, 56, 58, 59, 66 or 68).    COMMENTS: This test is not intended for use as a screening device for woman under age 30 with normal cervical cytology. Results should be correlated with cytologic and histologic findings. Close clinical followup is recommended.       Pap Screen reflex to HPV if ASCUS - recommend age 25 - 29     Status: Abnormal   Result Value Ref Range    Interpretation (A)       Atypical squamous cells of undetermined significance (ASC-US)    Other Findings  Endometrial cells in a woman >=45 years of age; endometrial cells correlate with menstrual history provided, Trichomonas vaginalis, Fungal organisms morphologically consistent with Candida spp, Shift in vaginal aj suggestive of bacterial vaginosis,...     Fungal organisms morphologically consistent with Candida spp    Comment         Papanicolaou Test Limitations:  Cervical cytology is a screening test with limited sensitivity, and regular screening is critical for cancer prevention.  Pap tests are primarily effective for the diagnosis/prevention of squamous cell carcinoma, not adenocarcinoma or other cancers.        Specimen Adequacy       Satisfactory for evaluation, endocervical/transformation zone component absent  Lack of clinical history, LMP not given    Clinical Information       none      Reflex Testing Yes if ASCUS     Previous Abnormal?       No      Performing Labs       The technical component of this testing was completed at [facility]     Wet prep - Clinic Collect     Status: Abnormal    Specimen: Vagina; Swab   Result Value Ref Range    Trichomonas Absent Absent    Yeast Present (A) Absent    Clue Cells Absent Absent    WBCs/high power field 1+ (A) None   Chlamydia trachomatis/Neisseria gonorrhoeae by PCR - Clinic Collect     Status: Normal    Specimen:  Endocervical/cervical; Swab   Result Value Ref Range    Chlamydia Trachomatis Negative Negative    Neisseria gonorrhoeae Negative Negative       ASSESSMENT/PLAN:   1. Adult general medical exam  This is a 25 yo female - here for physical exam - reviewed   - REVIEW OF HEALTH MAINTENANCE PROTOCOL ORDERS  - Comprehensive metabolic panel (BMP + Alb, Alk Phos, ALT, AST, Total. Bili, TP); Future  - Lipid Profile (Chol, Trig, HDL, LDL calc); Future  - CBC with platelets; Future  - Comprehensive metabolic panel (BMP + Alb, Alk Phos, ALT, AST, Total. Bili, TP)  - Lipid Profile (Chol, Trig, HDL, LDL calc)  - CBC with platelets    2. Screening for HIV (human immunodeficiency virus)  Discussed recommendation for HIV screening - low risk - ordered    - HIV Antigen Antibody Combo; Future  - HIV Antigen Antibody Combo    3. Need for hepatitis C screening test  Discussed recommendation for Hepatitis C screening - low risk - ordered    - Hepatitis C Screen Reflex to HCV RNA Quant and Genotype; Future  - Hepatitis C Screen Reflex to HCV RNA Quant and Genotype    4. Cervical cancer screening  Due for cervical cancer screening - pap done/sent  - Pap Screen reflex to HPV if ASCUS - recommend age 25 - 29; Future  - Pap Screen reflex to HPV if ASCUS - recommend age 25 - 29  - HPV High Risk Types DNA Cervical    5. Vision problems  Patient seen by optometrist - felt that she had significant change since last exam - thought she should be seen by a primary provider - will check labs (we don't have any notes from that eye doctor about what the concern was other than patient report that she had a fairly rapid decline in vision)  - Vitamin D Deficiency; Future  - Vitamin B12; Future  - CRP, inflammation; Future  - ESR: Erythrocyte sedimentation rate; Future  - TSH; Future  - Hemoglobin A1c; Future  - Vitamin D Deficiency  - Vitamin B12  - CRP, inflammation  - ESR: Erythrocyte sedimentation rate  - TSH  - Hemoglobin A1c    6. Vaginal  "discharge  Mild vaginal discharge on exam - check swabs   - Wet prep - Clinic Collect  - Chlamydia trachomatis/Neisseria gonorrhoeae by PCR - Clinic Collect      Patient has been advised of split billing requirements and indicates understanding: Yes    COUNSELING:  Reviewed preventive health counseling, as reflected in patient instructions       Regular exercise       Healthy diet/nutrition    Estimated body mass index is 32.82 kg/m  as calculated from the following:    Height as of this encounter: 1.673 m (5' 5.87\").    Weight as of this encounter: 91.9 kg (202 lb 8 oz).    Weight management plan: Discussed healthy diet and exercise guidelines    She reports that she has quit smoking. She has never used smokeless tobacco.      Counseling Resources:  ATP IV Guidelines  Pooled Cohorts Equation Calculator  Breast Cancer Risk Calculator  BRCA-Related Cancer Risk Assessment: FHS-7 Tool  FRAX Risk Assessment  ICSI Preventive Guidelines  Dietary Guidelines for Americans, 2010  [employer]'s MyPlate  ASA Prophylaxis  Lung CA Screening    MARLON BULL MD  [facility]  Answers for HPI/ROS submitted by the patient on 2/23/2022  If you checked off any problems, how difficult have these problems made it for you to do your work, take care of things at home, or get along with other people?: Somewhat difficult  PHQ9 TOTAL SCORE: 7      "

## 2022-02-24 DIAGNOSIS — E55.9 VITAMIN D DEFICIENCY: Primary | ICD-10-CM

## 2022-02-24 DIAGNOSIS — B37.31 YEAST VAGINITIS: ICD-10-CM

## 2022-02-24 LAB
C TRACH DNA SPEC QL PROBE+SIG AMP: NEGATIVE
DEPRECATED CALCIDIOL+CALCIFEROL SERPL-MC: 10 UG/L (ref 30–80)
N GONORRHOEA DNA SPEC QL NAA+PROBE: NEGATIVE

## 2022-02-24 RX ORDER — FLUCONAZOLE 150 MG/1
150 TABLET ORAL ONCE
Qty: 1 TABLET | Refills: 0 | Status: SHIPPED | OUTPATIENT
Start: 2022-02-24 | End: 2022-02-24

## 2022-02-24 ASSESSMENT — PATIENT HEALTH QUESTIONNAIRE - PHQ9: SUM OF ALL RESPONSES TO PHQ QUESTIONS 1-9: 7

## 2022-03-02 LAB
BKR LAB AP GYN ADEQUACY: ABNORMAL
BKR LAB AP GYN INTERPRETATION: ABNORMAL
BKR LAB AP GYN OTHER FINDINGS: ABNORMAL
BKR LAB AP HPV REFLEX: ABNORMAL
BKR LAB AP PREVIOUS ABNORMAL: ABNORMAL
PATH REPORT.COMMENTS IMP SPEC: ABNORMAL
PATH REPORT.COMMENTS IMP SPEC: ABNORMAL
PATH REPORT.RELEVANT HX SPEC: ABNORMAL

## 2022-03-04 LAB
HUMAN PAPILLOMA VIRUS 16 DNA: NEGATIVE
HUMAN PAPILLOMA VIRUS 18 DNA: NEGATIVE
HUMAN PAPILLOMA VIRUS FINAL DIAGNOSIS: ABNORMAL
HUMAN PAPILLOMA VIRUS OTHER HR: POSITIVE

## 2022-03-07 ENCOUNTER — PATIENT OUTREACH (OUTPATIENT)
Dept: FAMILY MEDICINE | Facility: CLINIC | Age: 27
End: 2022-03-07
Payer: COMMERCIAL

## 2022-03-07 PROBLEM — R87.810 ASCUS WITH POSITIVE HIGH RISK HPV CERVICAL: Status: ACTIVE | Noted: 2022-03-07

## 2022-03-07 PROBLEM — R87.610 ASCUS WITH POSITIVE HIGH RISK HPV CERVICAL: Status: ACTIVE | Noted: 2022-03-07

## 2022-04-07 ENCOUNTER — OFFICE VISIT (OUTPATIENT)
Dept: FAMILY MEDICINE | Facility: CLINIC | Age: 27
End: 2022-04-07
Payer: COMMERCIAL

## 2022-04-07 VITALS
SYSTOLIC BLOOD PRESSURE: 135 MMHG | HEART RATE: 60 BPM | RESPIRATION RATE: 18 BRPM | BODY MASS INDEX: 31.76 KG/M2 | TEMPERATURE: 97.8 F | WEIGHT: 196 LBS | DIASTOLIC BLOOD PRESSURE: 80 MMHG

## 2022-04-07 DIAGNOSIS — R87.810 ASCUS WITH POSITIVE HIGH RISK HPV CERVICAL: Primary | ICD-10-CM

## 2022-04-07 DIAGNOSIS — R87.610 ASCUS WITH POSITIVE HIGH RISK HPV CERVICAL: Primary | ICD-10-CM

## 2022-04-07 PROCEDURE — 99213 OFFICE O/P EST LOW 20 MIN: CPT | Performed by: PHYSICIAN ASSISTANT

## 2022-04-07 RX ORDER — FLUCONAZOLE 150 MG/1
TABLET ORAL
COMMUNITY
Start: 2022-02-24 | End: 2022-04-07

## 2022-04-07 RX ORDER — GABAPENTIN 300 MG/1
CAPSULE ORAL
COMMUNITY
Start: 2022-04-05 | End: 2023-08-23

## 2022-04-07 NOTE — PROGRESS NOTES
Subjective:      Ruthy David is a 26 year old female with chief complaint of follow-up abnormal Pap smear.  She had a Pap smear done on 2/23/2022 which was ASCUS with other high risk HPV.  This was a surprise to her, as her previous Pap smear in 2019 was normal.  She had has a monogamous partner for several years.  She had some questions about HPV, risks for it, what to do to treat it, etc.  We discussed HPV and abnormal Pap smears.  All of her questions were answered.    Patient Active Problem List   Diagnosis     Asthma     Moderate episode of recurrent major depressive disorder (H)     IUD (intrauterine device) in place     ASCUS with positive high risk HPV cervical       Current Outpatient Medications:      buPROPion (WELLBUTRIN XL) 150 MG 24 hr tablet, [BUPROPION (WELLBUTRIN XL) 150 MG 24 HR TABLET] Take 1 tablet (150 mg total) by mouth daily., Disp: 90 tablet, Rfl: 0     DULoxetine (CYMBALTA) 60 MG capsule, [DULOXETINE (CYMBALTA) 60 MG CAPSULE] Take 1 capsule (60 mg total) by mouth daily., Disp: 90 capsule, Rfl: 0     gabapentin (NEURONTIN) 300 MG capsule, TAKE 1 CAPSULE BY MOUTH AT BEDTIME, Disp: , Rfl:      nicotine (COMMIT) 2 MG lozenge, 1 LOZENGE IN MOUTH FOUR TIMES DAILY, Disp: , Rfl:      vitamin D3 (CHOLECALCIFEROL) 1.25 MG (57787 UT) capsule, Take 1 capsule (50,000 Units) by mouth once a week, Disp: 13 capsule, Rfl: 1     levonorgestreL (MIRENA) 20 mcg/24 hours (5 yrs) 52 mg IUD, [LEVONORGESTREL (MIRENA) 20 MCG/24 HOURS (5 YRS) 52 MG IUD] by Intrauterine route once for 1 dose., Disp: 1 each, Rfl: 0     Objective:     Allergies:  Patient has no known allergies.    Vitals:  /80 (BP Location: Left arm, Patient Position: Sitting, Cuff Size: Adult Large)   Pulse 60   Temp 97.8  F (36.6  C) (Temporal)   Resp 18   Wt 88.9 kg (196 lb)   BMI 31.76 kg/m    Body mass index is 31.76 kg/m .    Vital signs reviewed.  General: Patient is alert and oriented x 3, in no apparent distress,  well-groomed, bright affect      Assessment and Plan:   1. ASCUS with positive high risk HPV cervical  We reviewed these results today.  General reassurance provided.  She quit smoking several months ago.  I congratulated her on this.  She had thought perhaps she was going to have a colposcopy today, but unfortunately the clinic did not schedule her for this  - I am not trained to do colposcopies.  I apologized to patient for this error.  We scheduled her with Dr. Zamarripa to have a colposcopy before she left today.  All of patient's questions were answered.      This dictation uses voice recognition software, which may contain typographical errors.

## 2022-04-19 ENCOUNTER — OFFICE VISIT (OUTPATIENT)
Dept: FAMILY MEDICINE | Facility: CLINIC | Age: 27
End: 2022-04-19
Payer: COMMERCIAL

## 2022-04-19 VITALS
BODY MASS INDEX: 32.33 KG/M2 | SYSTOLIC BLOOD PRESSURE: 113 MMHG | WEIGHT: 199.5 LBS | TEMPERATURE: 97.3 F | HEART RATE: 79 BPM | OXYGEN SATURATION: 98 % | DIASTOLIC BLOOD PRESSURE: 79 MMHG

## 2022-04-19 DIAGNOSIS — R87.610 ASCUS WITH POSITIVE HIGH RISK HPV CERVICAL: Primary | ICD-10-CM

## 2022-04-19 DIAGNOSIS — R87.810 ASCUS WITH POSITIVE HIGH RISK HPV CERVICAL: Primary | ICD-10-CM

## 2022-04-19 PROCEDURE — 88305 TISSUE EXAM BY PATHOLOGIST: CPT | Performed by: PATHOLOGY

## 2022-04-19 PROCEDURE — 57456 ENDOCERV CURETTAGE W/SCOPE: CPT | Performed by: FAMILY MEDICINE

## 2022-04-19 NOTE — PROGRESS NOTES
Answers for HPI/ROS submitted by the patient on 4/19/2022  How many servings of fruits and vegetables do you eat daily?: 2-3  On average, how many sweetened beverages do you drink each day (Examples: soda, juice, sweet tea, etc.  Do NOT count diet or artificially sweetened beverages)?: 1  How many minutes a day do you exercise enough to make your heart beat faster?: 10 to 19  How many days a week do you exercise enough to make your heart beat faster?: 4  How many days per week do you miss taking your medication?: 1  What is the reason for your visit today?: Colposcopy    ASSESSMENT / PLAN:  1. ASCUS with positive high risk HPV cervical  This is a 27 yo female with first abnormal pap smear - discussed with patient - reviewed pap smears (with attention to her results);    - Surgical Pathology Exam    GYN: Colposcopy/LEEP    Date/Time: 4/19/2022 9:26 AM  Performed by: Milena Stevens MD  Authorized by: Milena Stevens MD     Consent:     Consent obtained:  Verbal    Consent given by:  Patient    Procedural risks discussed:  Bleeding    Patient questions answered: yes      Patient agrees, verbalizes understanding, and wants to proceed: yes    Universal protocol:     Patient states understanding of procedure being performed: yes    Pre-procedure:     Pre-procedure timeout performed: yes      Prepped with: acetic acid    Indication:     Indication:  ASC-US and HPV    HPV Indications:  Other high risk  Procedure:     Procedure: Colposcopy w/ endocervical curettage      Under satisfactory analgesia the patient was prepped and draped in the dorsal lithotomy position: yes      Joaquin speculum was placed in the vagina: yes      Under colposcopic examination the transition zone was seen in entirety: yes      Endocervix was curetted using a Kevorkian curette: yes      Monsel's solution was applied: yes      Biopsy(s): no      Specimen to pathology: yes    Post-procedure:     Findings: Negative       Impression: Normal appearing cervix      Patient tolerance of procedure:  Patient tolerated the procedure well with no immediate complications

## 2022-04-21 LAB
PATH REPORT.COMMENTS IMP SPEC: NORMAL
PATH REPORT.FINAL DX SPEC: NORMAL
PATH REPORT.GROSS SPEC: NORMAL
PATH REPORT.MICROSCOPIC SPEC OTHER STN: NORMAL
PATH REPORT.RELEVANT HX SPEC: NORMAL
PHOTO IMAGE: NORMAL

## 2022-05-10 ENCOUNTER — PATIENT OUTREACH (OUTPATIENT)
Dept: FAMILY MEDICINE | Facility: CLINIC | Age: 27
End: 2022-05-10
Payer: COMMERCIAL

## 2022-09-24 ENCOUNTER — HEALTH MAINTENANCE LETTER (OUTPATIENT)
Age: 27
End: 2022-09-24

## 2023-04-05 ENCOUNTER — PATIENT OUTREACH (OUTPATIENT)
Dept: FAMILY MEDICINE | Facility: CLINIC | Age: 28
End: 2023-04-05
Payer: COMMERCIAL

## 2023-05-08 ENCOUNTER — HEALTH MAINTENANCE LETTER (OUTPATIENT)
Age: 28
End: 2023-05-08

## 2023-05-25 NOTE — TELEPHONE ENCOUNTER
Jevon Stevens,   Patient is lost to pap tracking follow-up. Attempts to contact pt have been made per reminder process and there has been no reply and/or no appt scheduled.  If you are wanting any additional contact attempts please send to your care team staff.       Pap Hx:  02/23/22 ASCUS Pap, + HR HPV (not 16 or 18) Plan [facility] due bef 05/23/22 03/7/22 Left msg and Mychart result note sent. Mychart read same day  04/19/22 [facility] NO ANDRÉS Plan [facility] in 6-12 months due 04/19/23 04/5/23 Reminder Mychart  04/26/23 Reminder call-lm  05/25/23 Lost to follow-up for pap tracking, dayron routed to provider

## 2023-08-22 ASSESSMENT — ENCOUNTER SYMPTOMS
HEADACHES: 0
HEMATOCHEZIA: 0
DIZZINESS: 0
JOINT SWELLING: 0
WEAKNESS: 0
EYE PAIN: 0
PARESTHESIAS: 0
CHILLS: 0
HEMATURIA: 0
BREAST MASS: 0
FEVER: 0
ARTHRALGIAS: 0
FREQUENCY: 0
NAUSEA: 0
DYSURIA: 0
ABDOMINAL PAIN: 0
NERVOUS/ANXIOUS: 1
COUGH: 0
SORE THROAT: 0
SHORTNESS OF BREATH: 0
DIARRHEA: 0
CONSTIPATION: 0
PALPITATIONS: 0
HEARTBURN: 0
MYALGIAS: 0

## 2023-08-22 ASSESSMENT — PATIENT HEALTH QUESTIONNAIRE - PHQ9
SUM OF ALL RESPONSES TO PHQ QUESTIONS 1-9: 8
SUM OF ALL RESPONSES TO PHQ QUESTIONS 1-9: 8
10. IF YOU CHECKED OFF ANY PROBLEMS, HOW DIFFICULT HAVE THESE PROBLEMS MADE IT FOR YOU TO DO YOUR WORK, TAKE CARE OF THINGS AT HOME, OR GET ALONG WITH OTHER PEOPLE: SOMEWHAT DIFFICULT

## 2023-08-22 ASSESSMENT — ASTHMA QUESTIONNAIRES: ACT_TOTALSCORE: 20

## 2023-08-23 ENCOUNTER — OFFICE VISIT (OUTPATIENT)
Dept: FAMILY MEDICINE | Facility: CLINIC | Age: 28
End: 2023-08-23
Payer: COMMERCIAL

## 2023-08-23 VITALS
SYSTOLIC BLOOD PRESSURE: 106 MMHG | DIASTOLIC BLOOD PRESSURE: 74 MMHG | HEIGHT: 65 IN | OXYGEN SATURATION: 97 % | BODY MASS INDEX: 35.99 KG/M2 | RESPIRATION RATE: 18 BRPM | TEMPERATURE: 97.3 F | WEIGHT: 216 LBS | HEART RATE: 70 BPM

## 2023-08-23 DIAGNOSIS — Z12.4 CERVICAL CANCER SCREENING: ICD-10-CM

## 2023-08-23 DIAGNOSIS — F33.1 MODERATE EPISODE OF RECURRENT MAJOR DEPRESSIVE DISORDER (H): ICD-10-CM

## 2023-08-23 DIAGNOSIS — E55.9 VITAMIN D DEFICIENCY: ICD-10-CM

## 2023-08-23 DIAGNOSIS — R87.610 ASCUS WITH POSITIVE HIGH RISK HPV CERVICAL: ICD-10-CM

## 2023-08-23 DIAGNOSIS — R87.810 ASCUS WITH POSITIVE HIGH RISK HPV CERVICAL: ICD-10-CM

## 2023-08-23 DIAGNOSIS — J45.20 MILD INTERMITTENT ASTHMA WITHOUT COMPLICATION: ICD-10-CM

## 2023-08-23 DIAGNOSIS — N89.8 VAGINAL DISCHARGE: ICD-10-CM

## 2023-08-23 DIAGNOSIS — Z23 NEED FOR COVID-19 VACCINE: ICD-10-CM

## 2023-08-23 DIAGNOSIS — Z97.5 IUD (INTRAUTERINE DEVICE) IN PLACE: ICD-10-CM

## 2023-08-23 DIAGNOSIS — E66.812 CLASS 2 OBESITY WITHOUT SERIOUS COMORBIDITY WITH BODY MASS INDEX (BMI) OF 36.0 TO 36.9 IN ADULT, UNSPECIFIED OBESITY TYPE: ICD-10-CM

## 2023-08-23 DIAGNOSIS — F17.210 CIGARETTE NICOTINE DEPENDENCE WITHOUT COMPLICATION: ICD-10-CM

## 2023-08-23 DIAGNOSIS — Z00.00 ADULT GENERAL MEDICAL EXAM: Primary | ICD-10-CM

## 2023-08-23 LAB
ANION GAP SERPL CALCULATED.3IONS-SCNC: 12 MMOL/L (ref 7–15)
BUN SERPL-MCNC: 12 MG/DL (ref 6–20)
CALCIUM SERPL-MCNC: 9.7 MG/DL (ref 8.6–10)
CHLORIDE SERPL-SCNC: 107 MMOL/L (ref 98–107)
CLUE CELLS: ABNORMAL
CREAT SERPL-MCNC: 0.83 MG/DL (ref 0.51–0.95)
DEPRECATED HCO3 PLAS-SCNC: 22 MMOL/L (ref 22–29)
GFR SERPL CREATININE-BSD FRML MDRD: >90 ML/MIN/1.73M2
GLUCOSE SERPL-MCNC: 76 MG/DL (ref 70–99)
HGB BLD-MCNC: 13.6 G/DL (ref 11.7–15.7)
POTASSIUM SERPL-SCNC: 4.7 MMOL/L (ref 3.4–5.3)
SODIUM SERPL-SCNC: 141 MMOL/L (ref 136–145)
TRICHOMONAS, WET PREP: ABNORMAL
TSH SERPL DL<=0.005 MIU/L-ACNC: 3.44 UIU/ML (ref 0.3–4.2)
WBC'S/HIGH POWER FIELD, WET PREP: ABNORMAL
YEAST, WET PREP: ABNORMAL

## 2023-08-23 PROCEDURE — 36415 COLL VENOUS BLD VENIPUNCTURE: CPT | Performed by: FAMILY MEDICINE

## 2023-08-23 PROCEDURE — 0121A COVID-19 BIVALENT 12+ (PFIZER): CPT | Performed by: FAMILY MEDICINE

## 2023-08-23 PROCEDURE — 87210 SMEAR WET MOUNT SALINE/INK: CPT | Performed by: FAMILY MEDICINE

## 2023-08-23 PROCEDURE — 85018 HEMOGLOBIN: CPT | Performed by: FAMILY MEDICINE

## 2023-08-23 PROCEDURE — 91312 COVID-19 BIVALENT 12+ (PFIZER): CPT | Performed by: FAMILY MEDICINE

## 2023-08-23 PROCEDURE — 82306 VITAMIN D 25 HYDROXY: CPT | Performed by: FAMILY MEDICINE

## 2023-08-23 PROCEDURE — 99395 PREV VISIT EST AGE 18-39: CPT | Mod: 25 | Performed by: FAMILY MEDICINE

## 2023-08-23 PROCEDURE — 87591 N.GONORRHOEAE DNA AMP PROB: CPT | Performed by: FAMILY MEDICINE

## 2023-08-23 PROCEDURE — G0124 SCREEN C/V THIN LAYER BY MD: HCPCS | Performed by: PATHOLOGY

## 2023-08-23 PROCEDURE — 87624 HPV HI-RISK TYP POOLED RSLT: CPT | Performed by: FAMILY MEDICINE

## 2023-08-23 PROCEDURE — 87491 CHLMYD TRACH DNA AMP PROBE: CPT | Performed by: FAMILY MEDICINE

## 2023-08-23 PROCEDURE — G0145 SCR C/V CYTO,THINLAYER,RESCR: HCPCS | Performed by: FAMILY MEDICINE

## 2023-08-23 PROCEDURE — 80048 BASIC METABOLIC PNL TOTAL CA: CPT | Performed by: FAMILY MEDICINE

## 2023-08-23 PROCEDURE — 84443 ASSAY THYROID STIM HORMONE: CPT | Performed by: FAMILY MEDICINE

## 2023-08-23 PROCEDURE — 99214 OFFICE O/P EST MOD 30 MIN: CPT | Mod: 25 | Performed by: FAMILY MEDICINE

## 2023-08-23 RX ORDER — LORATADINE 10 MG/1
10 TABLET ORAL DAILY
COMMUNITY

## 2023-08-23 RX ORDER — MONTELUKAST SODIUM 10 MG/1
10 TABLET ORAL AT BEDTIME
Qty: 90 TABLET | Refills: 3 | Status: SHIPPED | OUTPATIENT
Start: 2023-08-23

## 2023-08-23 ASSESSMENT — ENCOUNTER SYMPTOMS
HEARTBURN: 0
HEMATOCHEZIA: 0
WEAKNESS: 0
DYSURIA: 0
DIARRHEA: 0
ABDOMINAL PAIN: 0
NERVOUS/ANXIOUS: 1
FREQUENCY: 0
CHILLS: 0
SHORTNESS OF BREATH: 0
ARTHRALGIAS: 0
FEVER: 0
NAUSEA: 0
PARESTHESIAS: 0
HEADACHES: 0
CONSTIPATION: 0
SORE THROAT: 0
MYALGIAS: 0
HEMATURIA: 0
EYE PAIN: 0
JOINT SWELLING: 0
DIZZINESS: 0
PALPITATIONS: 0
BREAST MASS: 0
COUGH: 0

## 2023-08-23 NOTE — PROGRESS NOTES
SUBJECTIVE:   CC: Ruthy is an 28 year old who presents for preventive health visit.       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     More exercise/ more outside time   Has a dog - 3-4 walks/day    Duloxetine - depression/anxiety  Claritin    Back to vaping (nicotine) - wants to quit - had used tobacco (cigarettes  Has used Nicotine lozenges -   Marijuana - daily -   Alcohol - occasionally -     Still struggles with intrusive thoughts - daily   Working with mental health - therapist - and psychiatrist   Doesn't want to die, no plans -       Healthy Habits:     Getting at least 3 servings of Calcium per day:  NO    Bi-annual eye exam:  Yes    Dental care twice a year:  NO    Sleep apnea or symptoms of sleep apnea:  None    Diet:  Regular (no restrictions)    Frequency of exercise:  6-7 days/week    Duration of exercise:  15-30 minutes    Taking medications regularly:  Yes    Medication side effects:  Not applicable    Additional concerns today:  No      Today's PHQ-9 Score:       8/22/2023    10:02 AM   PHQ-9 SCORE   PHQ-9 Total Score MyChart 8 (Mild depression)   PHQ-9 Total Score 8           Have you ever done Advance Care Planning? (For example, a Health Directive, POLST, or a discussion with a medical provider or your loved ones about your wishes): no written documents     Social History     Tobacco Use    Smoking status: Every Day     Types: Vaping Device    Smokeless tobacco: Never   Substance Use Topics    Alcohol use: Yes     Comment: Alcoholic Drinks/day: Occasional             8/22/2023    10:04 AM   Alcohol Use   Prescreen: >3 drinks/day or >7 drinks/week? No     Reviewed orders with patient.  Reviewed health maintenance and updated orders accordingly - Yes  BP  Readings from Last 3 Encounters:   08/23/23 106/74   04/19/22 113/79   04/07/22 135/80    Wt Readings from Last 3 Encounters:   08/23/23 98 kg (216 lb)   04/19/22 90.5 kg (199 lb 8 oz)   04/07/22 88.9 kg (196 lb)                  Patient Active Problem List   Diagnosis    Asthma    Moderate episode of recurrent major depressive disorder (H)    IUD (intrauterine device) in place    ASCUS with positive high risk HPV cervical     No past surgical history on file.    Social History     Tobacco Use    Smoking status: Every Day     Types: Vaping Device    Smokeless tobacco: Never   Substance Use Topics    Alcohol use: Yes     Comment: Alcoholic Drinks/day: Occasional     Family History   Problem Relation Age of Onset    Breast Cancer Mother 44    Breast Cancer Maternal Grandmother 60    Colorectal Cancer Maternal Grandmother     Brain Cancer Maternal Grandfather          Current Outpatient Medications   Medication Sig Dispense Refill    DULoxetine (CYMBALTA) 60 MG capsule [DULOXETINE (CYMBALTA) 60 MG CAPSULE] Take 1 capsule (60 mg total) by mouth daily. 90 capsule 0    levonorgestreL (MIRENA) 20 mcg/24 hours (5 yrs) 52 mg IUD [LEVONORGESTREL (MIRENA) 20 MCG/24 HOURS (5 YRS) 52 MG IUD] by Intrauterine route once for 1 dose. 1 each 0    loratadine (CLARITIN) 10 MG tablet Take 10 mg by mouth daily      montelukast (SINGULAIR) 10 MG tablet Take 1 tablet (10 mg) by mouth At Bedtime 90 tablet 3    nicotine (NICORETTE) 4 MG lozenge Place 1 lozenge (4 mg) inside cheek every hour as needed for smoking cessation 108 lozenge 6     No Known Allergies    Breast Cancer Screening:    FHS-7:       2/23/2022    10:35 AM 8/22/2023    11:07 AM   Breast CA Risk Assessment (FHS-7)   Did any of your first-degree relatives have breast or ovarian cancer? Yes Yes   Did any of your relatives have bilateral breast cancer? Yes No   Did any man in your family have breast cancer? No No   Did any woman in your family have breast and ovarian cancer? Yes  No   Did any woman in your family have breast cancer before age 50 y? Yes Yes   Do you have 2 or more relatives with breast and/or ovarian cancer? Yes Yes   Do you have 2 or more relatives with breast and/or bowel cancer? Yes Yes       Patient under 40 years of age: Routine Mammogram Screening not recommended.   Pertinent mammograms are reviewed under the imaging tab.    History of abnormal Pap smear: NO - age 21-29 PAP every 3 years recommended      Latest Ref Rng & Units 2022    11:56 AM 2019    10:11 AM   PAP / HPV   PAP  Atypical squamous cells of undetermined significance (ASC-US)  Negative for squamous intraepithelial lesion or malignancy  Electronically signed by Rina May CT (ASCP) on 2019 at  3:29 PM      HPV 16 DNA Negative Negative     HPV 18 DNA Negative Negative     Other HR HPV Negative Positive       Reviewed and updated as needed this visit by clinical staff   Tobacco  Allergies  Meds              Reviewed and updated as needed this visit by Provider                 Past Medical History:   Diagnosis Date    Abnormal Pap smear of cervix 2022    Cervical high risk HPV (human papillomavirus) test positive 2022      No past surgical history on file.  OB History    Para Term  AB Living   1 0 0 0 1 0   SAB IAB Ectopic Multiple Live Births   0 0 0 0 0      # Outcome Date GA Lbr [place]/2nd Weight Sex Delivery Anes PTL Lv   1 AB                Review of Systems   Constitutional:  Negative for chills and fever.   HENT:  Negative for congestion, ear pain, hearing loss and sore throat.    Eyes:  Negative for pain and visual disturbance.   Respiratory:  Negative for cough and shortness of breath.    Cardiovascular:  Negative for chest pain, palpitations and peripheral edema.   Gastrointestinal:  Negative for abdominal pain, constipation, diarrhea, heartburn, hematochezia and nausea.   Breasts:  Negative for tenderness, breast mass and  "discharge.   Genitourinary:  Negative for dysuria, frequency, genital sores, hematuria, pelvic pain, urgency, vaginal bleeding and vaginal discharge.   Musculoskeletal:  Negative for arthralgias, joint swelling and myalgias.   Skin:  Negative for rash.   Neurological:  Negative for dizziness, weakness, headaches and paresthesias.   Psychiatric/Behavioral:  Negative for mood changes. The patient is nervous/anxious.    All other systems reviewed and are negative.         OBJECTIVE:   /74 (BP Location: Left arm, Patient Position: Sitting, Cuff Size: Adult Large)   Pulse 70   Temp 97.3  F (36.3  C) (Temporal)   Resp 18   Ht 1.64 m (5' 4.57\")   Wt 98 kg (216 lb)   LMP  (LMP Unknown)   SpO2 97%   BMI 36.43 kg/m    Physical Exam  Vitals reviewed.   Constitutional:       General: She is not in acute distress.     Appearance: Normal appearance.   HENT:      Head: Normocephalic.      Right Ear: Tympanic membrane, ear canal and external ear normal.      Left Ear: Tympanic membrane, ear canal and external ear normal.      Nose: Nose normal.      Mouth/Throat:      Mouth: Mucous membranes are moist.      Pharynx: No posterior oropharyngeal erythema.   Eyes:      Extraocular Movements: Extraocular movements intact.      Conjunctiva/sclera: Conjunctivae normal.      Pupils: Pupils are equal, round, and reactive to light.   Cardiovascular:      Rate and Rhythm: Normal rate and regular rhythm.      Pulses: Normal pulses.      Heart sounds: Normal heart sounds. No murmur heard.  Pulmonary:      Effort: Pulmonary effort is normal.      Breath sounds: Normal breath sounds.   Abdominal:      Palpations: Abdomen is soft. There is no mass.      Tenderness: There is no abdominal tenderness. There is no guarding or rebound.   Genitourinary:     Comments: PELVIC EXAM:External genitalia: normal  Vaginal mucosa normal  Vaginal discharge: minimal white discharge  Speculum exam shows a normal appearing cervix . IUD strings (Mirena) " noted.   Bimanual exam: Cervix closed, firm, non tender  to motion.  Uterus  firm, regular, mobile, non tender to palpation. No adnexal masses or tenderness.     Musculoskeletal:         General: No deformity. Normal range of motion.      Cervical back: Normal range of motion and neck supple.   Lymphadenopathy:      Cervical: No cervical adenopathy.   Skin:     General: Skin is warm and dry.   Neurological:      General: No focal deficit present.      Mental Status: She is alert.   Psychiatric:         Mood and Affect: Mood normal.         Behavior: Behavior normal.           Diagnostic Test Results:  Labs reviewed in Epic  Results for orders placed or performed in visit on 08/23/23   Vitamin D Deficiency     Status: Normal   Result Value Ref Range    Vitamin D, Total (25-Hydroxy) 31 20 - 75 ug/L    Narrative    Season, race, dietary intake, and treatment affect the concentration of 25-hydroxy-Vitamin D. Values may decrease during winter months and increase during summer months. Values 20-29 ug/L may indicate Vitamin D insufficiency and values <20 ug/L may indicate Vitamin D deficiency.    Vitamin D determination is routinely performed by an immunoassay specific for 25 hydroxyvitamin D3.  If an individual is on vitamin D2(ergocalciferol) supplementation, please specify 25 OH vitamin D2 and D3 level determination by LCMSMS test VITD23.     Hemoglobin     Status: Normal   Result Value Ref Range    Hemoglobin 13.6 11.7 - 15.7 g/dL   TSH     Status: Normal   Result Value Ref Range    TSH 3.44 0.30 - 4.20 uIU/mL   Basic metabolic panel  (Ca, Cl, CO2, Creat, Gluc, K, Na, BUN)     Status: Normal   Result Value Ref Range    Sodium 141 136 - 145 mmol/L    Potassium 4.7 3.4 - 5.3 mmol/L    Chloride 107 98 - 107 mmol/L    Carbon Dioxide (CO2) 22 22 - 29 mmol/L    Anion Gap 12 7 - 15 mmol/L    Urea Nitrogen 12.0 6.0 - 20.0 mg/dL    Creatinine 0.83 0.51 - 0.95 mg/dL    Calcium 9.7 8.6 - 10.0 mg/dL    Glucose 76 70 - 99 mg/dL     "GFR Estimate >90 >60 mL/min/1.73m2   Wet prep - Clinic Collect     Status: Abnormal    Specimen: Vagina; Swab   Result Value Ref Range    Trichomonas Absent Absent    Yeast Absent Absent    Clue Cells Absent Absent    WBCs/high power field 2+ (A) None   Chlamydia trachomatis/Neisseria gonorrhoeae by PCR - Clinic Collect     Status: Normal    Specimen: Endocervical/cervical; Swab   Result Value Ref Range    Chlamydia Trachomatis Negative Negative    Neisseria gonorrhoeae Negative Negative       ASSESSMENT/PLAN:   1. Adult general medical exam  This is a 27 yo female - here for physical exam -   - Hemoglobin; Future  - Basic metabolic panel  (Ca, Cl, CO2, Creat, Gluc, K, Na, BUN); Future  - Hemoglobin  - Basic metabolic panel  (Ca, Cl, CO2, Creat, Gluc, K, Na, BUN)    2. Cervical cancer screening  Due for cervix cancer screening - pap/HPV done/sent   - Pap Screen reflex to HPV if ASCUS - recommend age 25 - 29  - HPV Hold (Lab Only)    3. Moderate episode of recurrent major depressive disorder (H)  Patient has depression - has mental health provider - indicates chronic \"desire to die\" which she defines different from suicidal ideation.  Has safety \"nets\" around her -     4. Need for COVID-19 vaccine  Due for COVID-19 booster - ordered   - COVID-19 BIVALENT 12+ (PFIZER)    5. ASCUS with positive high risk HPV cervical  H/o ASCUS with positive high risk HPV - pap/HPV done/sent     6. IUD (intrauterine device) in place  Has IUD - discussed that Mirena can stay in 8 years now     7. Vitamin D deficiency  H/o low vitamin D - ordered levels.   - Vitamin D Deficiency; Future  - Vitamin D Deficiency    8. Cigarette nicotine dependence without complication  H/o cigarette dependence - discussed options - will try Nicotine lozenges   - nicotine (NICORETTE) 4 MG lozenge; Place 1 lozenge (4 mg) inside cheek every hour as needed for smoking cessation  Dispense: 108 lozenge; Refill: 6    9. Mild intermittent asthma without " complication  Mild intermittent asthma - does not want more inhalers. Will try adding Montelukast -   - montelukast (SINGULAIR) 10 MG tablet; Take 1 tablet (10 mg) by mouth At Bedtime  Dispense: 90 tablet; Refill: 3    10. Class 2 obesity without serious comorbidity with body mass index (BMI) of 36.0 to 36.9 in adult, unspecified obesity type  Obesity - BMI >36.  Will check TSH.    - TSH; Future  - TSH    11. Vaginal discharge  Swabs done of vaginal discharge - check wet prep, GC/Chlamydia.   - Wet prep - Clinic Collect  - Chlamydia trachomatis/Neisseria gonorrhoeae by PCR - Clinic Collect          9/15/2020     9:42 AM 2/23/2022    10:36 AM 8/22/2023    10:05 AM   ACT Total Scores   ACT TOTAL SCORE (Goal Greater than or Equal to 20) 25 25 20   In the past 12 months, how many times did you visit the emergency room for your asthma without being admitted to the hospital? 0 0 0   In the past 12 months, how many times were you hospitalized overnight because of your asthma? 0 0 0         Patient has been advised of split billing requirements and indicates understanding: Yes    Depression Screening Follow Up        8/22/2023    10:02 AM   PHQ   PHQ-9 Total Score 8   Q9: Thoughts of better off dead/self-harm past 2 weeks Nearly every day   F/U: Thoughts of suicide or self-harm Yes   F/U: Self harm-plan No   F/U: Self-harm action No   F/U: Safety concerns No         8/22/2023    10:02 AM   Last PHQ-9   1.  Little interest or pleasure in doing things 1   2.  Feeling down, depressed, or hopeless 1   3.  Trouble falling or staying asleep, or sleeping too much 2   4.  Feeling tired or having little energy 1   5.  Poor appetite or overeating 0   6.  Feeling bad about yourself 0   7.  Trouble concentrating 0   8.  Moving slowly or restless 0   Q9: Thoughts of better off dead/self-harm past 2 weeks 3   PHQ-9 Total Score 8   In the past two weeks have you had thoughts of suicide or self harm? Yes   Do you have concerns about your  "personal safety or the safety of others? No   In the past 2 weeks have you thought about a plan or had intention to harm yourself? No   In the past 2 weeks have you acted on these thoughts in any way? No              No data to display                  Follow Up    Follow Up Actions Taken  Referred patient back to mental health provider    Discussed the following ways the patient can remain in a safe environment:  remove alcohol, remove drugs, and remove access to firearms:      COUNSELING:  Reviewed preventive health counseling, as reflected in patient instructions       Regular exercise       Healthy diet/nutrition      BMI:   Estimated body mass index is 36.43 kg/m  as calculated from the following:    Height as of this encounter: 1.64 m (5' 4.57\").    Weight as of this encounter: 98 kg (216 lb).   Weight management plan: Discussed healthy diet and exercise guidelines      She reports that she has been smoking vaping device. She has never used smokeless tobacco.  Nicotine/Tobacco Cessation Plan:   Information offered: Patient not interested at this time          MARLON BULL MD  [facility]    Prior to immunization administration, verified patients identity using patient s name and date of birth. Please see Immunization Activity for additional information.     Screening Questionnaire for Adult Immunization    Are you sick today?   No   Do you have allergies to medications, food, a vaccine component or latex?   No   Have you ever had a serious reaction after receiving a vaccination?   No   Do you have a long-term health problem with heart, lung, kidney, or metabolic disease (e.g., diabetes), asthma, a blood disorder, no spleen, complement component deficiency, a cochlear implant, or a spinal fluid leak?  Are you on long-term aspirin therapy?   No   Do you have cancer, leukemia, HIV/AIDS, or any other immune system problem?   No   Do you have a parent, brother, or sister " with an immune system problem?   No   In the past 3 months, have you taken medications that affect  your immune system, such as prednisone, other steroids, or anticancer drugs; drugs for the treatment of rheumatoid arthritis, Crohn s disease, or psoriasis; or have you had radiation treatments?   No   Have you had a seizure, or a brain or other nervous system problem?   No   During the past year, have you received a transfusion of blood or blood    products, or been given immune (gamma) globulin or antiviral drug?   No   For women: Are you pregnant or is there a chance you could become       pregnant during the next month?   No   Have you received any vaccinations in the past 4 weeks?   No     Immunization questionnaire answers were all negative.      Patient instructed to remain in clinic for 15 minutes afterwards, and to report any adverse reactions.     Screening performed by Priscilla Cazares CMA on 8/23/2023 at 7:54 AM.      Answers submitted by the patient for this visit:  Patient Health Questionnaire (Submitted on 8/22/2023)  If you checked off any problems, how difficult have these problems made it for you to do your work, take care of things at home, or get along with other people?: Somewhat difficult  PHQ9 TOTAL SCORE: 8

## 2023-08-24 LAB
C TRACH DNA SPEC QL PROBE+SIG AMP: NEGATIVE
DEPRECATED CALCIDIOL+CALCIFEROL SERPL-MC: 31 UG/L (ref 20–75)
N GONORRHOEA DNA SPEC QL NAA+PROBE: NEGATIVE

## 2023-08-29 LAB
BKR LAB AP GYN ADEQUACY: ABNORMAL
BKR LAB AP GYN INTERPRETATION: ABNORMAL
BKR LAB AP HPV REFLEX: ABNORMAL
BKR LAB AP PREVIOUS ABNL DX: ABNORMAL
BKR LAB AP PREVIOUS ABNORMAL: ABNORMAL
PATH REPORT.COMMENTS IMP SPEC: ABNORMAL
PATH REPORT.COMMENTS IMP SPEC: ABNORMAL
PATH REPORT.RELEVANT HX SPEC: ABNORMAL

## 2023-08-31 ENCOUNTER — PATIENT OUTREACH (OUTPATIENT)
Dept: FAMILY MEDICINE | Facility: CLINIC | Age: 28
End: 2023-08-31
Payer: COMMERCIAL

## 2023-10-14 ENCOUNTER — HEALTH MAINTENANCE LETTER (OUTPATIENT)
Age: 28
End: 2023-10-14

## 2024-08-05 ENCOUNTER — PATIENT OUTREACH (OUTPATIENT)
Dept: FAMILY MEDICINE | Facility: CLINIC | Age: 29
End: 2024-08-05
Payer: COMMERCIAL

## 2024-09-27 NOTE — TELEPHONE ENCOUNTER
Jevon Stevens,  Patient is lost to pap tracking follow-up. Attempts to contact pt have been made per reminder process and there has been no reply and/or no appt scheduled.     Pap Hx:  02/23/22 ASCUS Pap, + HR HPV (not 16 or 18) Plan [facility] due bef 05/23/22 03/7/22 Left msg and [employer] result note sent. Mychart read same day  04/19/22 [facility] NO ANDRÉS Plan cotest in 6-12 months due 04/19/23 05/25/23 Lost to follow-up for pap tracking, fyi routed to provider  08/29/23 LSIL Pap, +HR HPV (not 16 or 18) Plan cotest in 1 year due 08/29/24 08/31/23 Results and recommendations released to the pt through [employer]. Seen by patient Ruthy David on 9/1/2023  7:41 PM  08/5/24 Reminder Mychart  09/09/24 Reminder call-lm  09/27/24 Lost to follow-up for pap tracking         
Patient due for Pap and HPV.    Reminder done today via [employer].  
Patient due for Pap and HPV.    Reminder done today via telephone call.  
show

## 2024-10-16 DIAGNOSIS — J45.20 MILD INTERMITTENT ASTHMA WITHOUT COMPLICATION: ICD-10-CM

## 2024-10-17 DIAGNOSIS — J45.20 MILD INTERMITTENT ASTHMA WITHOUT COMPLICATION: ICD-10-CM

## 2024-10-17 RX ORDER — MONTELUKAST SODIUM 10 MG/1
1 TABLET ORAL AT BEDTIME
Qty: 90 TABLET | OUTPATIENT
Start: 2024-10-17

## 2024-10-17 RX ORDER — MONTELUKAST SODIUM 10 MG/1
1 TABLET ORAL AT BEDTIME
Qty: 30 TABLET | Refills: 0 | Status: SHIPPED | OUTPATIENT
Start: 2024-10-17

## 2024-11-18 SDOH — HEALTH STABILITY: PHYSICAL HEALTH: ON AVERAGE, HOW MANY MINUTES DO YOU ENGAGE IN EXERCISE AT THIS LEVEL?: 30 MIN

## 2024-11-18 SDOH — HEALTH STABILITY: PHYSICAL HEALTH: ON AVERAGE, HOW MANY DAYS PER WEEK DO YOU ENGAGE IN MODERATE TO STRENUOUS EXERCISE (LIKE A BRISK WALK)?: 2 DAYS

## 2024-11-18 ASSESSMENT — ASTHMA QUESTIONNAIRES
ACT_TOTALSCORE: 23
QUESTION_4 LAST FOUR WEEKS HOW OFTEN HAVE YOU USED YOUR RESCUE INHALER OR NEBULIZER MEDICATION (SUCH AS ALBUTEROL): NOT AT ALL
QUESTION_2 LAST FOUR WEEKS HOW OFTEN HAVE YOU HAD SHORTNESS OF BREATH: ONCE OR TWICE A WEEK
QUESTION_3 LAST FOUR WEEKS HOW OFTEN DID YOUR ASTHMA SYMPTOMS (WHEEZING, COUGHING, SHORTNESS OF BREATH, CHEST TIGHTNESS OR PAIN) WAKE YOU UP AT NIGHT OR EARLIER THAN USUAL IN THE MORNING: NOT AT ALL
QUESTION_1 LAST FOUR WEEKS HOW MUCH OF THE TIME DID YOUR ASTHMA KEEP YOU FROM GETTING AS MUCH DONE AT WORK, SCHOOL OR AT HOME: NONE OF THE TIME
QUESTION_5 LAST FOUR WEEKS HOW WOULD YOU RATE YOUR ASTHMA CONTROL: WELL CONTROLLED
ACT_TOTALSCORE: 23

## 2024-11-18 ASSESSMENT — SOCIAL DETERMINANTS OF HEALTH (SDOH): HOW OFTEN DO YOU GET TOGETHER WITH FRIENDS OR RELATIVES?: TWICE A WEEK

## 2024-11-20 ENCOUNTER — OFFICE VISIT (OUTPATIENT)
Dept: FAMILY MEDICINE | Facility: CLINIC | Age: 29
End: 2024-11-20
Payer: COMMERCIAL

## 2024-11-20 VITALS
HEIGHT: 65 IN | HEART RATE: 66 BPM | DIASTOLIC BLOOD PRESSURE: 64 MMHG | OXYGEN SATURATION: 99 % | TEMPERATURE: 97.8 F | BODY MASS INDEX: 36.15 KG/M2 | WEIGHT: 217 LBS | SYSTOLIC BLOOD PRESSURE: 102 MMHG

## 2024-11-20 DIAGNOSIS — F33.1 MODERATE EPISODE OF RECURRENT MAJOR DEPRESSIVE DISORDER (H): ICD-10-CM

## 2024-11-20 DIAGNOSIS — Z12.4 CERVICAL CANCER SCREENING: Primary | ICD-10-CM

## 2024-11-20 DIAGNOSIS — Z97.5 IUD (INTRAUTERINE DEVICE) IN PLACE: ICD-10-CM

## 2024-11-20 DIAGNOSIS — Z23 NEED FOR IMMUNIZATION AGAINST INFLUENZA: ICD-10-CM

## 2024-11-20 DIAGNOSIS — Z12.4 CERVICAL CANCER SCREENING: ICD-10-CM

## 2024-11-20 DIAGNOSIS — Z23 NEED FOR COVID-19 VACCINE: ICD-10-CM

## 2024-11-20 DIAGNOSIS — Z23 NEED FOR PNEUMOCOCCAL 20-VALENT CONJUGATE VACCINATION: ICD-10-CM

## 2024-11-20 DIAGNOSIS — Z00.00 ADULT GENERAL MEDICAL EXAM: Primary | ICD-10-CM

## 2024-11-20 DIAGNOSIS — Z23 NEED FOR DIPHTHERIA-TETANUS-PERTUSSIS (TDAP) VACCINE: ICD-10-CM

## 2024-11-20 DIAGNOSIS — J45.20 MILD INTERMITTENT ASTHMA WITHOUT COMPLICATION: ICD-10-CM

## 2024-11-20 PROCEDURE — 91320 SARSCV2 VAC 30MCG TRS-SUC IM: CPT | Performed by: FAMILY MEDICINE

## 2024-11-20 PROCEDURE — 90480 ADMN SARSCOV2 VAC 1/ONLY CMP: CPT | Performed by: FAMILY MEDICINE

## 2024-11-20 PROCEDURE — 90472 IMMUNIZATION ADMIN EACH ADD: CPT | Performed by: FAMILY MEDICINE

## 2024-11-20 PROCEDURE — 87624 HPV HI-RISK TYP POOLED RSLT: CPT | Performed by: FAMILY MEDICINE

## 2024-11-20 PROCEDURE — 90656 IIV3 VACC NO PRSV 0.5 ML IM: CPT | Performed by: FAMILY MEDICINE

## 2024-11-20 PROCEDURE — 90715 TDAP VACCINE 7 YRS/> IM: CPT | Performed by: FAMILY MEDICINE

## 2024-11-20 PROCEDURE — G0124 SCREEN C/V THIN LAYER BY MD: HCPCS | Performed by: PATHOLOGY

## 2024-11-20 PROCEDURE — 90677 PCV20 VACCINE IM: CPT | Performed by: FAMILY MEDICINE

## 2024-11-20 PROCEDURE — 99395 PREV VISIT EST AGE 18-39: CPT | Mod: 25 | Performed by: FAMILY MEDICINE

## 2024-11-20 PROCEDURE — G0145 SCR C/V CYTO,THINLAYER,RESCR: HCPCS | Performed by: FAMILY MEDICINE

## 2024-11-20 PROCEDURE — 90471 IMMUNIZATION ADMIN: CPT | Performed by: FAMILY MEDICINE

## 2024-11-20 RX ORDER — MONTELUKAST SODIUM 10 MG/1
1 TABLET ORAL AT BEDTIME
Qty: 90 TABLET | Refills: 4 | Status: SHIPPED | OUTPATIENT
Start: 2024-11-20

## 2024-11-20 NOTE — PROGRESS NOTES
Preventive Care Visit  [facility]  MARLON BULL MD, Family Medicine  Nov 20, 2024  {Provider  Link to SmartSet :007855}    {PROVIDER CHARTING PREFERENCE:927851}    Debbie Bliss is a 29 year old, presenting for the following:  Physical        11/20/2024     3:23 PM   Additional Questions   Roomed by Juliane      {ALERT  Recent PHQ-9/EPDS score indicates suicidal ideations, document follow-up within the A/P section of the note :397201}{Provider Documentation  Link to C-SSRS ([facility]) Flowsheet :350440}    Got Montelukast last year - has been helpful to her  Lungs were better over her summer allergy season    Duloxetine 60 mg daily   Claritin prn    Mom and maternal grandma both breast cancer survivors (mom was 43, g'ma was 62)  Neg BRCA  gene          ***  {MA/LPN/RN Pre-Provider Visit Orders- hCG/UA/Strep (Optional):762934}  {SUPERLIST (Optional):542719}  {additonal problems for provider to add (Optional):440963}  Health Care Directive  Patient does not have a Health Care Directive: Discussed advance care planning with patient; however, patient declined at this time.      11/18/2024   General Health   How would you rate your overall physical health? Good   Feel stress (tense, anxious, or unable to sleep) To some extent      (!) STRESS CONCERN      11/18/2024   Nutrition   Three or more servings of calcium each day? (!) NO   Diet: Regular (no restrictions)   How many servings of fruit and vegetables per day? (!) 2-3   How many sweetened beverages each day? (!) 2            11/18/2024   Exercise   Days per week of moderate/strenous exercise 2 days   Average minutes spent exercising at this level 30 min      (!) EXERCISE CONCERN      11/18/2024   Social Factors   Frequency of gathering with friends or relatives Twice a week   Worry food won't last until get money to buy more No   Food not last or not have enough money for food? No   Do you have housing?  (Housing is defined as stable permanent housing and does not include staying ouside in a car, in a tent, in an abandoned building, in an overnight shelter, or couch-surfing.) Yes   Are you worried about losing your housing? No   Lack of transportation? No   Unable to get utilities (heat,electricity)? No            11/18/2024   Dental   Dentist two times every year? (!) NO            11/18/2024   TB Screening   Were you born outside of the [place]? No          Today's PHQ-9 Score:       11/19/2024     5:56 PM   PHQ-9 SCORE   PHQ-9 Total Score MyChart 8 (Mild depression)   PHQ-9 Total Score 8        Patient-reported         11/18/2024   Substance Use   Alcohol more than 3/day or more than 7/wk No   Do you use any other substances recreationally? (!) CANNABIS PRODUCTS        Social History     Tobacco Use    Smoking status: Every Day     Types: Vaping Device    Smokeless tobacco: Never   Vaping Use    Vaping status: Every Day    Substances: Nicotine    Devices: Disposable   Substance Use Topics    Alcohol use: Yes     Comment: Alcoholic Drinks/day: Occasional    Drug use: Yes     Types: Marijuana     Comment: Drug use: uses it to help fight her chronic depression      {Provider  If there are gaps in the social history shown above, please follow the link to update and then refresh the note Link to Social and Substance History :265698}      8/22/2023   LAST FHS-7 RESULTS   1st degree relative breast or ovarian cancer Yes    Any relative bilateral breast cancer No    Any male have breast cancer No    Any ONE woman have BOTH breast AND ovarian cancer No    Any woman with breast cancer before 50yrs Yes    2 or more relatives with breast AND/OR ovarian cancer Yes    2 or more relatives with breast AND/OR bowel cancer Yes        Patient-reported     {If any of the questions to the FHS7 are answered yes, consider referral for genetic counseling.    Additional indications for genetic referral include personal history of breast or  "ovarian cancer, genetic mutation in 1st degree relative which increases risk of breast cancer including BRCA1, BRCA2, CHRISTA, PALB 2, TP53, CHEK2, PTEN, CDH1, STK11 (per ACS) and/or 1st degree relative with history of pancreatic or high-risk prostate cancer (per NCCN):986559}   {Mammogram Decision Support (Optional):760816}        11/18/2024   STI Screening   New sexual partner(s) since last STI/HIV test? No        History of abnormal Pap smear: { :484121}        Latest Ref Rng & Units 8/23/2023     8:26 AM 2/23/2022    11:56 AM 1/22/2019    10:11 AM   PAP / HPV   PAP  Low-grade squamous intraepithelial lesion (LSIL) encompassing HPV/mild dysplasia/CIN1  Atypical squamous cells of undetermined significance (ASC-US)  Negative for squamous intraepithelial lesion or malignancy  Electronically signed by Rina May CT (ASCP) on 1/25/2019 at  3:29 PM      HPV 16 DNA Negative Negative  Negative     HPV 18 DNA Negative Negative  Negative     Other HR HPV Negative Positive  Positive             11/18/2024   Contraception/Family Planning   Questions about contraception or family planning No        {Provider  REQUIRED FOR AWV Use the storyboard to review patient history, after sections have been marked as reviewed, refresh note to capture documentation:605917}   Reviewed and updated as needed this visit by Provider                    {HISTORY OPTIONS (Optional):173099}    {ROS Picklists (Optional):285373}     Objective    Exam  /64 (BP Location: Left arm, Patient Position: Sitting, Cuff Size: Adult Large)   Pulse 66   Temp 97.8  F (36.6  C) (Temporal)   Ht 1.64 m (5' 4.57\")   Wt 98.4 kg (217 lb)   LMP  (LMP Unknown)   SpO2 99%   BMI 36.60 kg/m     Estimated body mass index is 36.6 kg/m  as calculated from the following:    Height as of this encounter: 1.64 m (5' 4.57\").    Weight as of this encounter: 98.4 kg (217 lb).    Physical Exam  {Exam Choices (Optional):752061}        Signed Electronically by: " MARLON BULL MD  {Email feedback regarding this note to [email or web address]   :079324}Prior to immunization administration, verified patients identity using patient s name and date of birth. Please see Immunization Activity for additional information.     Screening Questionnaire for Adult Immunization    Are you sick today?   No   Do you have allergies to medications, food, a vaccine component or latex?   No   Have you ever had a serious reaction after receiving a vaccination?   No   Do you have a long-term health problem with heart, lung, kidney, or metabolic disease (e.g., diabetes), asthma, a blood disorder, no spleen, complement component deficiency, a cochlear implant, or a spinal fluid leak?  Are you on long-term aspirin therapy?   Yes   Do you have cancer, leukemia, HIV/AIDS, or any other immune system problem?   No   Do you have a parent, brother, or sister with an immune system problem?   No   In the past 3 months, have you taken medications that affect  your immune system, such as prednisone, other steroids, or anticancer drugs; drugs for the treatment of rheumatoid arthritis, Crohn s disease, or psoriasis; or have you had radiation treatments?   No   Have you had a seizure, or a brain or other nervous system problem?   No   During the past year, have you received a transfusion of blood or blood    products, or been given immune (gamma) globulin or antiviral drug?   No   For women: Are you pregnant or is there a chance you could become       pregnant during the next month?   No   Have you received any vaccinations in the past 4 weeks?   No     Immunization questionnaire was positive for at least one answer.  Notified Dr Zamarripa.      Patient instructed to remain in clinic for 15 minutes afterwards, and to report any adverse reactions.     Screening performed by Juliane Savage on 11/20/2024 at 3:28 PM.   Answers submitted by the patient for this visit:  Patient  Health Questionnaire (Submitted on 11/19/2024)  If you checked off any problems, how difficult have these problems made it for you to do your work, take care of things at home, or get along with other people?: Somewhat difficult  PHQ9 TOTAL SCORE: 8     movements intact.      Conjunctiva/sclera: Conjunctivae normal.      Pupils: Pupils are equal, round, and reactive to light.   Cardiovascular:      Rate and Rhythm: Normal rate and regular rhythm.      Pulses: Normal pulses.      Heart sounds: Normal heart sounds. No murmur heard.  Pulmonary:      Effort: Pulmonary effort is normal.      Breath sounds: Normal breath sounds.   Abdominal:      Palpations: Abdomen is soft. There is no mass.      Tenderness: There is no abdominal tenderness. There is no guarding or rebound.   Genitourinary:     Comments: PELVIC EXAM:External genitalia: normal  Vaginal mucosa normal  Vaginal discharge: none  Speculum exam shows a normal appearing cervix .   Bimanual exam: Cervix closed, firm, non tender  to motion.    Musculoskeletal:         General: No deformity. Normal range of motion.      Cervical back: Normal range of motion and neck supple.   Lymphadenopathy:      Cervical: No cervical adenopathy.   Skin:     General: Skin is warm and dry.   Neurological:      General: No focal deficit present.      Mental Status: She is alert.   Psychiatric:         Mood and Affect: Mood normal.         Behavior: Behavior normal.               Signed Electronically by: MARLON BULL MD  Prior to immunization administration, verified patients identity using patient s name and date of birth. Please see Immunization Activity for additional information.     Screening Questionnaire for Adult Immunization    Are you sick today?   No   Do you have allergies to medications, food, a vaccine component or latex?   No   Have you ever had a serious reaction after receiving a vaccination?   No   Do you have a long-term health problem with heart, lung, kidney, or metabolic disease (e.g., diabetes), asthma, a blood disorder, no spleen, complement component deficiency, a cochlear implant, or a spinal fluid leak?  Are you on long-term aspirin therapy?   Yes   Do you have cancer, leukemia, HIV/AIDS,  or any other immune system problem?   No   Do you have a parent, brother, or sister with an immune system problem?   No   In the past 3 months, have you taken medications that affect  your immune system, such as prednisone, other steroids, or anticancer drugs; drugs for the treatment of rheumatoid arthritis, Crohn s disease, or psoriasis; or have you had radiation treatments?   No   Have you had a seizure, or a brain or other nervous system problem?   No   During the past year, have you received a transfusion of blood or blood    products, or been given immune (gamma) globulin or antiviral drug?   No   For women: Are you pregnant or is there a chance you could become       pregnant during the next month?   No   Have you received any vaccinations in the past 4 weeks?   No     Immunization questionnaire was positive for at least one answer.  Notified Dr Zamarripa.      Patient instructed to remain in clinic for 15 minutes afterwards, and to report any adverse reactions.     Screening performed by Juliane Savage on 11/20/2024 at 3:28 PM.   Answers submitted by the patient for this visit:  Patient Health Questionnaire (Submitted on 11/19/2024)  If you checked off any problems, how difficult have these problems made it for you to do your work, take care of things at home, or get along with other people?: Somewhat difficult  PHQ9 TOTAL SCORE: 8

## 2024-11-21 LAB
HPV HR 12 DNA CVX QL NAA+PROBE: NEGATIVE
HPV16 DNA CVX QL NAA+PROBE: NEGATIVE
HPV18 DNA CVX QL NAA+PROBE: NEGATIVE
HUMAN PAPILLOMA VIRUS FINAL DIAGNOSIS: NORMAL

## 2024-11-27 LAB
BKR AP ASSOCIATED HPV REPORT: ABNORMAL
BKR LAB AP GYN ADEQUACY: ABNORMAL
BKR LAB AP GYN INTERPRETATION: ABNORMAL
BKR LAB AP PREVIOUS ABNL DX: ABNORMAL
BKR LAB AP PREVIOUS ABNORMAL: ABNORMAL
PATH REPORT.COMMENTS IMP SPEC: ABNORMAL
PATH REPORT.COMMENTS IMP SPEC: ABNORMAL
PATH REPORT.RELEVANT HX SPEC: ABNORMAL

## 2024-12-01 ENCOUNTER — HEALTH MAINTENANCE LETTER (OUTPATIENT)
Age: 29
End: 2024-12-01

## 2024-12-08 ASSESSMENT — ENCOUNTER SYMPTOMS
CHILLS: 0
COLOR CHANGE: 0
SHORTNESS OF BREATH: 0
BACK PAIN: 0
DYSURIA: 0
ABDOMINAL PAIN: 0
FEVER: 0
EYE PAIN: 0
ARTHRALGIAS: 0
VOMITING: 0
COUGH: 0
HEMATURIA: 0
SORE THROAT: 0
SEIZURES: 0
PALPITATIONS: 0

## 2025-07-15 SDOH — HEALTH STABILITY: PHYSICAL HEALTH: ON AVERAGE, HOW MANY MINUTES DO YOU ENGAGE IN EXERCISE AT THIS LEVEL?: 20 MIN

## 2025-07-15 SDOH — HEALTH STABILITY: PHYSICAL HEALTH: ON AVERAGE, HOW MANY DAYS PER WEEK DO YOU ENGAGE IN MODERATE TO STRENUOUS EXERCISE (LIKE A BRISK WALK)?: 4 DAYS

## 2025-07-15 ASSESSMENT — ASTHMA QUESTIONNAIRES
ACT_TOTALSCORE: 25
QUESTION_1 LAST FOUR WEEKS HOW MUCH OF THE TIME DID YOUR ASTHMA KEEP YOU FROM GETTING AS MUCH DONE AT WORK, SCHOOL OR AT HOME: NONE OF THE TIME
QUESTION_3 LAST FOUR WEEKS HOW OFTEN DID YOUR ASTHMA SYMPTOMS (WHEEZING, COUGHING, SHORTNESS OF BREATH, CHEST TIGHTNESS OR PAIN) WAKE YOU UP AT NIGHT OR EARLIER THAN USUAL IN THE MORNING: NOT AT ALL
QUESTION_4 LAST FOUR WEEKS HOW OFTEN HAVE YOU USED YOUR RESCUE INHALER OR NEBULIZER MEDICATION (SUCH AS ALBUTEROL): NOT AT ALL
QUESTION_5 LAST FOUR WEEKS HOW WOULD YOU RATE YOUR ASTHMA CONTROL: COMPLETELY CONTROLLED
QUESTION_2 LAST FOUR WEEKS HOW OFTEN HAVE YOU HAD SHORTNESS OF BREATH: NOT AT ALL

## 2025-07-15 ASSESSMENT — SOCIAL DETERMINANTS OF HEALTH (SDOH): HOW OFTEN DO YOU GET TOGETHER WITH FRIENDS OR RELATIVES?: TWICE A WEEK

## 2025-07-17 ENCOUNTER — TELEPHONE (OUTPATIENT)
Dept: FAMILY MEDICINE | Facility: CLINIC | Age: 30
End: 2025-07-17
Payer: COMMERCIAL

## 2025-07-17 NOTE — TELEPHONE ENCOUNTER
Contacts       Contact Date/Time Type Contact Phone/Fax    07/17/2025 12:16 PM CDT Phone (Outgoing) Ruthy David (Self) [phone] (M)          Attempted to reach patient to: Left message on VM,reminded patient of her appt on 7/21/25 @3:20 with Dr Zamarripa

## 2025-07-21 ENCOUNTER — OFFICE VISIT (OUTPATIENT)
Dept: FAMILY MEDICINE | Facility: CLINIC | Age: 30
End: 2025-07-21
Payer: COMMERCIAL

## 2025-07-21 VITALS
SYSTOLIC BLOOD PRESSURE: 116 MMHG | OXYGEN SATURATION: 98 % | WEIGHT: 229 LBS | HEIGHT: 65 IN | RESPIRATION RATE: 20 BRPM | DIASTOLIC BLOOD PRESSURE: 75 MMHG | HEART RATE: 80 BPM | TEMPERATURE: 98.6 F | BODY MASS INDEX: 38.15 KG/M2

## 2025-07-21 DIAGNOSIS — R87.612 PAPANICOLAOU SMEAR OF CERVIX WITH LOW GRADE SQUAMOUS INTRAEPITHELIAL LESION (LGSIL): Primary | ICD-10-CM

## 2025-07-21 DIAGNOSIS — F33.1 MODERATE EPISODE OF RECURRENT MAJOR DEPRESSIVE DISORDER (H): ICD-10-CM

## 2025-07-21 DIAGNOSIS — Z31.69 ENCOUNTER FOR PRECONCEPTION CONSULTATION: ICD-10-CM

## 2025-07-21 DIAGNOSIS — R87.612 PAPANICOLAOU SMEAR OF CERVIX WITH LOW GRADE SQUAMOUS INTRAEPITHELIAL LESION (LGSIL): ICD-10-CM

## 2025-07-21 DIAGNOSIS — Z30.432 ENCOUNTER FOR IUD REMOVAL: ICD-10-CM

## 2025-07-21 DIAGNOSIS — Z00.00 ADULT GENERAL MEDICAL EXAM: Primary | ICD-10-CM

## 2025-07-21 PROCEDURE — 99459 PELVIC EXAMINATION: CPT | Performed by: FAMILY MEDICINE

## 2025-07-21 PROCEDURE — 58301 REMOVE INTRAUTERINE DEVICE: CPT | Performed by: FAMILY MEDICINE

## 2025-07-21 PROCEDURE — 88141 CYTOPATH C/V INTERPRET: CPT | Performed by: PATHOLOGY

## 2025-07-21 PROCEDURE — 1126F AMNT PAIN NOTED NONE PRSNT: CPT | Performed by: FAMILY MEDICINE

## 2025-07-21 PROCEDURE — 3074F SYST BP LT 130 MM HG: CPT | Performed by: FAMILY MEDICINE

## 2025-07-21 PROCEDURE — 3078F DIAST BP <80 MM HG: CPT | Performed by: FAMILY MEDICINE

## 2025-07-21 PROCEDURE — 88175 CYTOPATH C/V AUTO FLUID REDO: CPT | Performed by: FAMILY MEDICINE

## 2025-07-21 PROCEDURE — 87624 HPV HI-RISK TYP POOLED RSLT: CPT | Performed by: FAMILY MEDICINE

## 2025-07-21 PROCEDURE — 99395 PREV VISIT EST AGE 18-39: CPT | Mod: 25 | Performed by: FAMILY MEDICINE

## 2025-07-21 RX ORDER — PNV NO.95/FERROUS FUM/FOLIC AC 28MG-0.8MG
1 TABLET ORAL DAILY
Qty: 90 TABLET | Refills: 4 | Status: SHIPPED | OUTPATIENT
Start: 2025-07-21

## 2025-07-21 ASSESSMENT — PATIENT HEALTH QUESTIONNAIRE - PHQ9
10. IF YOU CHECKED OFF ANY PROBLEMS, HOW DIFFICULT HAVE THESE PROBLEMS MADE IT FOR YOU TO DO YOUR WORK, TAKE CARE OF THINGS AT HOME, OR GET ALONG WITH OTHER PEOPLE: VERY DIFFICULT
SUM OF ALL RESPONSES TO PHQ QUESTIONS 1-9: 11
SUM OF ALL RESPONSES TO PHQ QUESTIONS 1-9: 11

## 2025-07-21 ASSESSMENT — PAIN SCALES - GENERAL: PAINLEVEL_OUTOF10: NO PAIN (0)

## 2025-07-21 NOTE — PROGRESS NOTES
"Preventive Care Visit  [facility]  MILENA STEVENS MD, Family Medicine  Jul 21, 2025  {Provider  Link to SmartSet :420991}    {PROVIDER CHARTING PREFERENCE:710969}    GYN: Remove IUD    Date/Time: 7/21/2025 4:16 PM    Performed by: Milena Stevens MD  Authorized by: Milena Stevens MD    Consent:     Consent obtained:  Verbal    Consent given by:  Patient    Procedure risks and benefits discussed: yes      Patient questions answered: yes      Patient agrees, verbalizes understanding, and wants to proceed: yes    Procedure:     Removed with no complications: yes    Comments:      Desires future pregnancy       Debbie Bliss is a 29 year old, presenting for the following:  Physical and IUD (removal)        7/21/2025     3:21 PM   Additional Questions   Roomed by dwight         7/21/2025   Forms   Any forms needing to be completed Yes      {ALERT  Recent PHQ-9/EPDS score indicates suicidal ideations, document follow-up within the A/P section of the note :310056}{Provider Documentation  Link to C-SSRS ([facility]) Flowsheet :486292}    Quit smoking and vaping!  Used lozenges - then switched to Wintogreen   No nicotine since New Years Amy  Off lozenges since 2 months+    Did a tomato tunnel     [place] time [place]     [facility]  Trying to \"get off the grass\"  Still has psychiatrist -   Struggling with anxiety/depression    Knows she needs to quit marijuana - once off that, will understand more of what the baseline is  Still has intrusive thoughts     Wants IUD removed - wants to try for pregnancy at end of year  Wants cycles to get back to normal before trying -       Breathing is \"so great\" - definitely better after quitting vaping         ***   {MA/LPN/RN Pre-Provider Visit Orders- hCG/UA/Strep (Optional):283636}  {SUPERLIST (Optional):989356}  {additonal problems for provider to add " (Optional):231949}  Advance Care Planning  {The storyboard will display whether the patient has ACP docs on file. Hover over the Code section in the storyboard to access the ACP documents. :210208}  Health Care Directive received at today's visit.  Forwarded to [facility].        7/15/2025   General Health   How would you rate your overall physical health? Good   Feel stress (tense, anxious, or unable to sleep) To some extent   (!) STRESS CONCERN      7/15/2025   Nutrition   Three or more servings of calcium each day? (!) NO   Diet: Regular (no restrictions)   How many servings of fruit and vegetables per day? (!) 2-3   How many sweetened beverages each day? 0-1         7/15/2025   Exercise   Days per week of moderate/strenous exercise 4 days   Average minutes spent exercising at this level 20 min         7/15/2025   Social Factors   Frequency of gathering with friends or relatives Twice a week   Worry food won't last until get money to buy more No   Food not last or not have enough money for food? No   Do you have housing? (Housing is defined as stable permanent housing and does not include staying outside in a car, in a tent, in an abandoned building, in an overnight shelter, or couch-surfing.) Yes   Are you worried about losing your housing? No   Lack of transportation? No   Unable to get utilities (heat,electricity)? No         7/15/2025   Dental   Dentist two times every year? Yes       Today's PHQ-9 Score:       7/21/2025     3:18 PM   PHQ-9 SCORE   PHQ-9 Total Score MyChart 11 (Moderate depression)   PHQ-9 Total Score 11        Patient-reported         7/15/2025   Substance Use   Alcohol more than 3/day or more than 7/wk No   Do you use any other substances recreationally? (!) CANNABIS PRODUCTS     Social History     Tobacco Use    Smoking status: Former    Smokeless tobacco: Former   Vaping Use    Vaping status: Former    Substances: Nicotine    Devices: Disposable   Substance Use Topics    Alcohol  use: Yes     Comment: Alcoholic Drinks/day: Occasional    Drug use: Yes     Types: Marijuana     Comment: Drug use: uses it to help fight her chronic depression      {Provider  If there are gaps in the social history shown above, please follow the link to update and then refresh the note Link to Social and Substance History :728773}      8/22/2023   LAST FHS-7 RESULTS   1st degree relative breast or ovarian cancer Yes   Any relative bilateral breast cancer No   Any male have breast cancer No   Any ONE woman have BOTH breast AND ovarian cancer No   Any woman with breast cancer before 50yrs Yes   2 or more relatives with breast AND/OR ovarian cancer Yes   2 or more relatives with breast AND/OR bowel cancer Yes     {If any of the questions to the FHS7 are answered yes, consider referral for genetic counseling.    Additional indications for genetic referral include personal history of breast or ovarian cancer, genetic mutation in 1st degree relative which increases risk of breast cancer including BRCA1, BRCA2, CHRISTA, PALB 2, TP53, CHEK2, PTEN, CDH1, STK11 (per ACS) and/or 1st degree relative with history of pancreatic or high-risk prostate cancer (per NCCN):210246}   {Mammogram Decision Support (Optional):010119}        7/15/2025   STI Screening   New sexual partner(s) since last STI/HIV test? No     History of abnormal Pap smear: { :978466}        Latest Ref Rng & Units 11/20/2024     4:23 PM 8/23/2023     8:26 AM 2/23/2022    11:56 AM   PAP / HPV   PAP  Low-grade squamous intraepithelial lesion (LSIL) encompassing HPV/mild dysplasia/CIN1  Low-grade squamous intraepithelial lesion (LSIL) encompassing HPV/mild dysplasia/CIN1  Atypical squamous cells of undetermined significance (ASC-US)    HPV 16 DNA Negative Negative  Negative  Negative    HPV 18 DNA Negative Negative  Negative  Negative    Other HR HPV Negative Negative  Positive  Positive            7/15/2025   Contraception/Family Planning   Questions about  "contraception or family planning (!) YES ***   What are your periods like? Not currently having periods     {Provider  REQUIRED FOR AWV Use the storyboard to review patient history, after sections have been marked as reviewed, refresh note to capture documentation:512594}   Reviewed and updated as needed this visit by Provider                    {HISTORY OPTIONS (Optional):302252}    {ROS Picklists (Optional):548883}     Objective    Exam  /75 (BP Location: Right arm, Patient Position: Sitting, Cuff Size: Adult Regular)   Pulse 80   Temp 98.6  F (37  C) (Oral)   Resp 20   Ht 1.64 m (5' 4.57\")   Wt 103.9 kg (229 lb)   SpO2 98%   BMI 38.62 kg/m     Estimated body mass index is 38.62 kg/m  as calculated from the following:    Height as of this encounter: 1.64 m (5' 4.57\").    Weight as of this encounter: 103.9 kg (229 lb).    Physical Exam  {Exam Choices (Optional):471916}    Prior to immunization administration, verified patients identity using patient s name and date of birth. Please see Immunization Activity for additional information.     Screening Questionnaire for Adult Immunization    Are you sick today?   No   Do you have allergies to medications, food, a vaccine component or latex?   No   Have you ever had a serious reaction after receiving a vaccination?   No   Do you have a long-term health problem with heart, lung, kidney, or metabolic disease (e.g., diabetes), asthma, a blood disorder, no spleen, complement component deficiency, a cochlear implant, or a spinal fluid leak?  Are you on long-term aspirin therapy?   Yes   Do you have cancer, leukemia, HIV/AIDS, or any other immune system problem?   No   Do you have a parent, brother, or sister with an immune system problem?   No   In the past 3 months, have you taken medications that affect  your immune system, such as prednisone, other steroids, or anticancer drugs; drugs for the treatment of rheumatoid arthritis, Crohn s disease, or psoriasis; " or have you had radiation treatments?   No   Have you had a seizure, or a brain or other nervous system problem?   No   During the past year, have you received a transfusion of blood or blood    products, or been given immune (gamma) globulin or antiviral drug?   No   For women: Are you pregnant or is there a chance you could become       pregnant during the next month?   No   Have you received any vaccinations in the past 4 weeks?   No     Immunization questionnaire was positive for at least one answer.  Notified .      Patient instructed to remain in clinic for 15 minutes afterwards, and to report any adverse reactions.     Screening performed by Janie Zafar MA on 7/21/2025 at 3:29 PM.         Signed Electronically by: MARLON BULL MD  {Email feedback regarding this note to [email or web address]   :862323}  Answers submitted by the patient for this visit:  Patient Health Questionnaire (Submitted on 7/21/2025)  If you checked off any problems, how difficult have these problems made it for you to do your work, take care of things at home, or get along with other people?: Very difficult  PHQ9 TOTAL SCORE: 11     "following:    Height as of this encounter: 1.64 m (5' 4.57\").    Weight as of this encounter: 103.9 kg (229 lb).    Physical Exam  Vitals reviewed.   Constitutional:       General: She is not in acute distress.     Appearance: Normal appearance.   HENT:      Head: Normocephalic.      Right Ear: Tympanic membrane, ear canal and external ear normal.      Left Ear: Tympanic membrane, ear canal and external ear normal.      Nose: Nose normal.      Mouth/Throat:      Mouth: Mucous membranes are moist.      Pharynx: No posterior oropharyngeal erythema.   Eyes:      Extraocular Movements: Extraocular movements intact.      Conjunctiva/sclera: Conjunctivae normal.      Pupils: Pupils are equal, round, and reactive to light.   Cardiovascular:      Rate and Rhythm: Normal rate and regular rhythm.      Pulses: Normal pulses.      Heart sounds: Normal heart sounds. No murmur heard.  Pulmonary:      Effort: Pulmonary effort is normal.      Breath sounds: Normal breath sounds.   Abdominal:      Palpations: Abdomen is soft. There is no mass.      Tenderness: There is no abdominal tenderness. There is no guarding or rebound.   Genitourinary:     Comments: PELVIC EXAM:External genitalia: normal  Vaginal mucosa normal  Vaginal discharge: minimal  Speculum exam shows a normal appearing cervix  - IUD strings easily identified and IUD removed. .   Bimanual exam: Cervix closed, firm, non tender  to motion.    Musculoskeletal:         General: No deformity. Normal range of motion.      Cervical back: Normal range of motion and neck supple.   Lymphadenopathy:      Cervical: No cervical adenopathy.   Skin:     General: Skin is warm and dry.   Neurological:      General: No focal deficit present.      Mental Status: She is alert.   Psychiatric:         Mood and Affect: Mood normal.         Behavior: Behavior normal.       Results for orders placed or performed in visit on 07/21/25   HPV and Gynecologic Cytology Panel     Status: None "   Result Value Ref Range    Human Papilloma Virus 16 DNA Negative Negative    Human Papilloma Virus 18 DNA Negative Negative    Human Papilloma Virus Other Negative Negative    FINAL DIAGNOSIS       This patient's sample is negative for high risk HPV DNA.          METHODOLOGY: The [employer] system uses automated extraction, simultaneous amplification of HPV (E6/E7 oncogenes) and beta-globin, followed by real time detection of fluorescent labeled HPV and beta globin using specific oligonucleotide probes. The test specifically identifies types HPV 16 DNA and HPV 18 DNA while concurrently detecting the rest of the high risk types (31, 33, 35, 39, 45, 51, 52, 56, 58, 59, 66 or 68).     COMMENTS: This test is not intended for use as a screening device for woman under age 30 with normal cervical cytology. Results should be correlated with cytologic and histologic findings. Close clinical follow up is recommended.      Please see the separate Gynecologic Cytology (Pap) report from the same collection date.     Gynecologic Cytology (PAP)     Status: Abnormal   Result Value Ref Range    Interpretation (A)       Low-grade squamous intraepithelial lesion (LSIL) encompassing HPV/mild dysplasia/CIN1    Comment         Papanicolaou Test Limitations:  Cervical cytology is a screening test with limited sensitivity, and regular screening is critical for cancer prevention.  Pap tests are primarily effective for the diagnosis/prevention of squamous cell carcinoma, not adenocarcinoma or other cancers.        Specimen Adequacy       Satisfactory for evaluation, endocervical/transformation zone component present    Clinical Information       none      LMP/Menopause Date         Comment: no bleeding with IUD      Previous Abnormal?       Yes      Previous Abnormal Diagnosis       LGSIL      Performing Labs       The technical component of this testing was completed at [facility].    Stain controls for all stains resulted within this report have been reviewed and show appropriate reactivity.      Associated HPV Report       Please see the associated HPV High Risk Types DNA Cervical report for Specimen [id number] from the same collection date.           Prior to immunization administration, verified patients identity using patient s name and date of birth. Please see Immunization Activity for additional information.     Screening Questionnaire for Adult Immunization    Are you sick today?   No   Do you have allergies to medications, food, a vaccine component or latex?   No   Have you ever had a serious reaction after receiving a vaccination?   No   Do you have a long-term health problem with heart, lung, kidney, or metabolic disease (e.g., diabetes), asthma, a blood disorder, no spleen, complement component deficiency, a cochlear implant, or a spinal fluid leak?  Are you on long-term aspirin therapy?   Yes   Do you have cancer, leukemia, HIV/AIDS, or any other immune system problem?   No   Do you have a parent, brother, or sister with an immune system problem?   No   In the past 3 months, have you taken medications that affect  your immune system, such as prednisone, other steroids, or anticancer drugs; drugs for the treatment of rheumatoid arthritis, Crohn s disease, or psoriasis; or have you had radiation treatments?   No   Have you had a seizure, or a brain or other nervous system problem?   No   During the past year, have you received a transfusion of blood or blood    products, or been given immune (gamma) globulin or antiviral drug?   No   For women: Are you pregnant or is there a chance you could become       pregnant during the next month?   No   Have you received any vaccinations in the past 4 weeks?   No     Immunization questionnaire was positive for at least one answer.  Notified .      Patient instructed to remain in clinic for 15 minutes afterwards, and to report  any adverse reactions.     Screening performed by Janie Zafar MA on 7/21/2025 at 3:29 PM.         Signed Electronically by: MARLON BULL MD    Answers submitted by the patient for this visit:  Patient Health Questionnaire (Submitted on 7/21/2025)  If you checked off any problems, how difficult have these problems made it for you to do your work, take care of things at home, or get along with other people?: Very difficult  PHQ9 TOTAL SCORE: 11

## 2025-07-30 LAB
BKR AP ASSOCIATED HPV REPORT: ABNORMAL
BKR LAB AP GYN ADEQUACY: ABNORMAL
BKR LAB AP GYN INTERPRETATION: ABNORMAL
BKR LAB AP LMP: ABNORMAL
BKR LAB AP PREVIOUS ABNL DX: ABNORMAL
BKR LAB AP PREVIOUS ABNORMAL: ABNORMAL
PATH REPORT.COMMENTS IMP SPEC: ABNORMAL
PATH REPORT.COMMENTS IMP SPEC: ABNORMAL
PATH REPORT.RELEVANT HX SPEC: ABNORMAL

## 2025-07-31 ENCOUNTER — PATIENT OUTREACH (OUTPATIENT)
Dept: FAMILY MEDICINE | Facility: CLINIC | Age: 30
End: 2025-07-31
Payer: COMMERCIAL

## 2025-08-24 ASSESSMENT — ENCOUNTER SYMPTOMS
SHORTNESS OF BREATH: 0
VOMITING: 0
FEVER: 0
PALPITATIONS: 0
BACK PAIN: 0
HEMATURIA: 0
SORE THROAT: 0
DYSURIA: 0
COUGH: 0
SEIZURES: 0
CHILLS: 0
EYE PAIN: 0
COLOR CHANGE: 0
ARTHRALGIAS: 0
ABDOMINAL PAIN: 0